# Patient Record
Sex: FEMALE | Race: WHITE | NOT HISPANIC OR LATINO | Employment: UNEMPLOYED
[De-identification: names, ages, dates, MRNs, and addresses within clinical notes are randomized per-mention and may not be internally consistent; named-entity substitution may affect disease eponyms.]

---

## 2017-01-06 ENCOUNTER — RECORDS - HEALTHEAST (OUTPATIENT)
Dept: ADMINISTRATIVE | Facility: OTHER | Age: 27
End: 2017-01-06

## 2017-01-10 ENCOUNTER — COMMUNICATION - HEALTHEAST (OUTPATIENT)
Dept: FAMILY MEDICINE | Facility: CLINIC | Age: 27
End: 2017-01-10

## 2017-01-17 ENCOUNTER — OFFICE VISIT - HEALTHEAST (OUTPATIENT)
Dept: FAMILY MEDICINE | Facility: CLINIC | Age: 27
End: 2017-01-17

## 2017-01-17 DIAGNOSIS — Z76.89 ENCOUNTER TO ESTABLISH CARE: ICD-10-CM

## 2017-01-17 DIAGNOSIS — F11.20 SEVERE OPIOID USE DISORDER (H): ICD-10-CM

## 2017-01-17 DIAGNOSIS — F11.93 ACUTE OPIOID WITHDRAWAL (H): ICD-10-CM

## 2017-01-17 DIAGNOSIS — F11.20 METHADONE MAINTENANCE THERAPY PATIENT (H): ICD-10-CM

## 2017-01-17 LAB
ATRIAL RATE - MUSE: 81 BPM
DIASTOLIC BLOOD PRESSURE - MUSE: NORMAL MMHG
INTERPRETATION ECG - MUSE: NORMAL
P AXIS - MUSE: 54 DEGREES
PR INTERVAL - MUSE: 122 MS
QRS DURATION - MUSE: 84 MS
QT - MUSE: 366 MS
QTC - MUSE: 425 MS
R AXIS - MUSE: 15 DEGREES
SYSTOLIC BLOOD PRESSURE - MUSE: NORMAL MMHG
T AXIS - MUSE: 34 DEGREES
VENTRICULAR RATE- MUSE: 81 BPM

## 2017-01-17 ASSESSMENT — MIFFLIN-ST. JEOR: SCORE: 1636.66

## 2017-03-27 ENCOUNTER — OFFICE VISIT - HEALTHEAST (OUTPATIENT)
Dept: FAMILY MEDICINE | Facility: CLINIC | Age: 27
End: 2017-03-27

## 2017-03-27 DIAGNOSIS — B37.31 CANDIDAL VULVOVAGINITIS: ICD-10-CM

## 2017-03-29 ENCOUNTER — ANESTHESIA - HEALTHEAST (OUTPATIENT)
Dept: OBGYN | Facility: CLINIC | Age: 27
End: 2017-03-29

## 2017-03-30 ENCOUNTER — HOME CARE/HOSPICE - HEALTHEAST (OUTPATIENT)
Dept: HOME HEALTH SERVICES | Facility: HOME HEALTH | Age: 27
End: 2017-03-30

## 2017-04-13 ENCOUNTER — COMMUNICATION - HEALTHEAST (OUTPATIENT)
Dept: OBGYN | Facility: CLINIC | Age: 27
End: 2017-04-13

## 2017-04-27 ENCOUNTER — COMMUNICATION - HEALTHEAST (OUTPATIENT)
Dept: OBGYN | Facility: CLINIC | Age: 27
End: 2017-04-27

## 2017-09-27 ENCOUNTER — COMMUNICATION - HEALTHEAST (OUTPATIENT)
Dept: FAMILY MEDICINE | Facility: CLINIC | Age: 27
End: 2017-09-27

## 2017-12-21 ENCOUNTER — OFFICE VISIT - HEALTHEAST (OUTPATIENT)
Dept: FAMILY MEDICINE | Facility: CLINIC | Age: 27
End: 2017-12-21

## 2017-12-21 DIAGNOSIS — R53.83 FATIGUE DUE TO DEPRESSION: ICD-10-CM

## 2017-12-21 DIAGNOSIS — F32.A FATIGUE DUE TO DEPRESSION: ICD-10-CM

## 2017-12-21 ASSESSMENT — MIFFLIN-ST. JEOR: SCORE: 1681.56

## 2018-01-09 ENCOUNTER — COMMUNICATION - HEALTHEAST (OUTPATIENT)
Dept: FAMILY MEDICINE | Facility: CLINIC | Age: 28
End: 2018-01-09

## 2018-01-31 ENCOUNTER — OFFICE VISIT - HEALTHEAST (OUTPATIENT)
Dept: FAMILY MEDICINE | Facility: CLINIC | Age: 28
End: 2018-01-31

## 2018-01-31 DIAGNOSIS — F11.20 SEVERE OPIOID USE DISORDER (H): ICD-10-CM

## 2018-01-31 DIAGNOSIS — F19.10 DRUG ABUSE (H): ICD-10-CM

## 2018-01-31 DIAGNOSIS — F32.A DEPRESSION: ICD-10-CM

## 2018-01-31 DIAGNOSIS — G90.521 LEG REFLEX SYMPATHETIC DYSTROPHY, RIGHT: ICD-10-CM

## 2018-01-31 DIAGNOSIS — F11.93 ACUTE OPIOID WITHDRAWAL (H): ICD-10-CM

## 2018-01-31 ASSESSMENT — MIFFLIN-ST. JEOR: SCORE: 1619.87

## 2018-01-31 NOTE — ASSESSMENT & PLAN NOTE
She has agreed not to use opoids and only takes otc meds now. (previously was on opoids through pain clinic per her hx and needed methadone to wean off).      I agreed to be her doctor if she agreed she would not ask me to prescribe opoids to her.

## 2018-01-31 NOTE — ASSESSMENT & PLAN NOTE
Was on opoids and methadone in past, no longer takes any narcotics and only uses otc meds, agreed that if I am primary she will not ask me to prescribe opoids.

## 2018-01-31 NOTE — ASSESSMENT & PLAN NOTE
She has agreed not to use opoids and only takes otc meds now. (previously was on opoids through pain clinic per her hx and needed methadone to wean off).      I agreed to be her doctor if sheagreed she would not ask me to prescribe opoids to her.

## 2018-02-02 ENCOUNTER — COMMUNICATION - HEALTHEAST (OUTPATIENT)
Dept: FAMILY MEDICINE | Facility: CLINIC | Age: 28
End: 2018-02-02

## 2018-03-02 ENCOUNTER — OFFICE VISIT - HEALTHEAST (OUTPATIENT)
Dept: FAMILY MEDICINE | Facility: CLINIC | Age: 28
End: 2018-03-02

## 2018-03-02 DIAGNOSIS — N89.8 VAGINAL DISCHARGE: ICD-10-CM

## 2018-07-29 ENCOUNTER — OFFICE VISIT - HEALTHEAST (OUTPATIENT)
Dept: FAMILY MEDICINE | Facility: CLINIC | Age: 28
End: 2018-07-29

## 2018-07-29 DIAGNOSIS — B37.31 CANDIDAL VULVOVAGINITIS: ICD-10-CM

## 2018-08-01 ENCOUNTER — COMMUNICATION - HEALTHEAST (OUTPATIENT)
Dept: FAMILY MEDICINE | Facility: CLINIC | Age: 28
End: 2018-08-01

## 2019-01-18 ENCOUNTER — RECORDS - HEALTHEAST (OUTPATIENT)
Dept: LAB | Facility: CLINIC | Age: 29
End: 2019-01-18

## 2019-01-18 LAB — TSH SERPL DL<=0.005 MIU/L-ACNC: 3.27 UIU/ML (ref 0.3–5)

## 2020-05-29 ENCOUNTER — RECORDS - HEALTHEAST (OUTPATIENT)
Dept: ADMINISTRATIVE | Facility: OTHER | Age: 30
End: 2020-05-29

## 2020-06-02 ENCOUNTER — HOSPITAL ENCOUNTER (OUTPATIENT)
Dept: ULTRASOUND IMAGING | Facility: CLINIC | Age: 30
Discharge: HOME | End: 2020-06-02

## 2020-06-02 DIAGNOSIS — M79.604 PAIN IN RIGHT LEG: ICD-10-CM

## 2020-08-06 ENCOUNTER — ANESTHESIA - HEALTHEAST (OUTPATIENT)
Dept: OBGYN | Facility: HOSPITAL | Age: 30
End: 2020-08-06

## 2020-08-07 ENCOUNTER — COMMUNICATION - HEALTHEAST (OUTPATIENT)
Dept: SCHEDULING | Facility: CLINIC | Age: 30
End: 2020-08-07

## 2020-08-08 ENCOUNTER — HOME CARE/HOSPICE - HEALTHEAST (OUTPATIENT)
Dept: HOME HEALTH SERVICES | Facility: HOME HEALTH | Age: 30
End: 2020-08-08

## 2020-08-12 ENCOUNTER — HOME CARE/HOSPICE - HEALTHEAST (OUTPATIENT)
Dept: HOME HEALTH SERVICES | Facility: HOME HEALTH | Age: 30
End: 2020-08-12

## 2020-08-15 ENCOUNTER — AMBULATORY - HEALTHEAST (OUTPATIENT)
Dept: PEDIATRICS | Facility: CLINIC | Age: 30
End: 2020-08-15

## 2020-09-08 ENCOUNTER — COMMUNICATION - HEALTHEAST (OUTPATIENT)
Dept: PEDIATRICS | Facility: CLINIC | Age: 30
End: 2020-09-08

## 2021-05-26 VITALS
TEMPERATURE: 97.7 F | SYSTOLIC BLOOD PRESSURE: 102 MMHG | DIASTOLIC BLOOD PRESSURE: 72 MMHG | OXYGEN SATURATION: 98 % | HEART RATE: 88 BPM | RESPIRATION RATE: 16 BRPM

## 2021-05-30 VITALS — BODY MASS INDEX: 25.79 KG/M2 | HEIGHT: 71 IN | WEIGHT: 184.2 LBS

## 2021-05-31 VITALS — HEIGHT: 70 IN | WEIGHT: 184 LBS | BODY MASS INDEX: 26.34 KG/M2

## 2021-05-31 VITALS — BODY MASS INDEX: 27.17 KG/M2 | HEIGHT: 71 IN | WEIGHT: 194.1 LBS

## 2021-06-02 ENCOUNTER — RECORDS - HEALTHEAST (OUTPATIENT)
Dept: ADMINISTRATIVE | Facility: CLINIC | Age: 31
End: 2021-06-02

## 2021-06-08 NOTE — PROGRESS NOTES
"PLAN:  ASSESSMENT:  1. Encounter to establish care  2. Methadone maintenance therapy patient  3. Severe opioid use disorder  4. Acute opioid withdrawal  - Electrocardiogram Perform and Read: Normal Sinus with normal QT    PLAN:  Patient Instructions   Continue treatment at pain clinic and follow up for any acute illness.    Orders Placed This Encounter   Procedures     Electrocardiogram Perform and Read     There are no discontinued medications.    No Follow-up on file.    CHIEF COMPLAINT:  Chief Complaint   Patient presents with     Establish Care     pt needs EKG for treatment center-[facility]     Abnormal ECG       HISTORY OF PRESENT ILLNESS:  Dorothy is a 26 y.o. female presenting to the clinic today to establish care and to follow up after an abnormal ECG. She is taking 40 MG of methadone twice daily; she has been taking it since 12/23/2016. She is currently receiving care [facility] for counseling and medication management. She gets her medication daily from [facility] and she receives counseling once a week. She also has a support group that she meets with weekly at [employer].     REVIEW OF SYSTEMS:   She is 29 weeks pregnant.     She has made been making strange noises, like crying, sniffing, etc. in her sleep at night, she thinks due to methadone use. She has been sleeping well.     She last took oxycodone 12/21/2016.     All other systems are negative.    PFSH:  She has one daughter, who is two years old. She lives with her fiance and daughter.     Allergies   Allergen Reactions     Other Environmental Allergy Swelling     Metal       TOBACCO USE:  History   Smoking Status     Never Smoker   Smokeless Tobacco     Never Used       VITALS:  Vitals:    01/17/17 1118   BP: 136/76   Pulse: 66   SpO2: 97%   Weight: 184 lb 3.2 oz (83.6 kg)   Height: 5' 11\" (1.803 m)     Wt Readings from Last 3 Encounters:   01/17/17 184 lb 3.2 oz (83.6 kg)   12/22/16 170 lb (77.1 kg)   12/01/15 160 lb " (72.6 kg)     Body mass index is 25.69 kg/(m^2).     PHYSICAL EXAM:  Constitutional:  Reveals alert, well appearing.  Vitals:  Per nursing notes.  Cardiac:  Regular rate and rhythm without murmurs, rubs, or gallops. Carotids without bruits. Legs without edema. Palpation of the radial pulse regular.  Lungs: Clear.  Respiratory effort normal.  Neurologic:  Cranial nerves II-XII intact.     Psychiatric:  Mood appropriate, memory intact.     EKG: Normal    QUALITY MEASURES:  I have had an Advance Directives discussion with the patient.    ADDITIONAL HISTORY SUMMARIZED (2): Reviewed hospital admission note, 12/23/2016. Reviewed ED note for withdrawal, 12/1/2015.   DECISION TO OBTAIN EXTRA INFORMATION (1): None.   RADIOLOGY TESTS (1): None.  LABS (1): None.  MEDICINE TESTS (1): Ordered EKG.   INDEPENDENT REVIEW (2 each): None.     The visit lasted a total of 22 minutes face to face with the patient. Over 50% of the time was spent counseling and educating the patient about Methadone use.    IClaire, am scribing for and in the presence of, Promise Amajiri, FNP.    IAnnette FNP personally performed the services described in this documentation, as scribed by Claire Garrett in my presence, and it is both accurate and complete.    MEDICATIONS:  Current Outpatient Prescriptions   Medication Sig Dispense Refill     acetaminophen (TYLENOL) 325 MG tablet Take by mouth.       doxylamine (UNISON) 25 mg tablet Take 1 tablet (25 mg total) by mouth bedtime as needed for sleep. 30 tablet 0     enoxaparin (LOVENOX) 40 mg/0.4 mL syringe Inject 40 mg under the skin daily.  2     methadone (METHADOSE) 40 mg disintegrating tablet Take 40 mg by mouth 2 (two) times a day.       prenatal vitamin iron-folic acid 27mg-0.8mg (PRENATAL S) 27 mg iron- 800 mcg Tab tablet Take 1 tablet by mouth daily.       senna-docusate (PERICOLACE) 8.6-50 mg tablet Take 1-4 tablets by mouth 2 (two) times a day.  0     sertraline (ZOLOFT) 25 MG  tablet Take 1 tablet (25 mg total) by mouth daily. 30 tablet 0     ondansetron (ZOFRAN) 4 MG tablet TK ONE T PO TID PRN NV  0     pyridoxine, vitamin B6, (B-6) 25 MG tablet Take 1 tablet (25 mg total) by mouth bedtime as needed (give with doxylamine).  0     No current facility-administered medications for this visit.

## 2021-06-09 NOTE — ANESTHESIA PREPROCEDURE EVALUATION
Anesthesia Evaluation      Patient summary reviewed   No history of anesthetic complications     Airway   Mallampati: II  Neck ROM: full   Pulmonary - negative ROS and normal exam    breath sounds clear to auscultation                         Cardiovascular - normal exam  Rhythm: regular  Rate: normal,         Neuro/Psych    (+) neuromuscular disease,  anxiety/panic attacks,     Endo/Other    (+) obesity, pregnant     GI/Hepatic/Renal            Dental - normal exam                        Anesthesia Plan  Planned anesthetic: epidural    ASA 2     Anesthetic plan and risks discussed with: patient and spouse    Post-op plan: routine recovery

## 2021-06-09 NOTE — ANESTHESIA PROCEDURE NOTES
Epidural Block    Patient location during procedure: OB  Time Called: 3/29/2017 9:30 PM  Reason for Block:labor epidural  Staffing:  Performing  Anesthesiologist: ZEFERINO SCHWARTZ  Preanesthetic Checklist  Completed: patient identified, risks, benefits, and alternatives discussed, timeout performed, consent obtained, airway assessed, oxygen available, suction available, emergency drugs available and hand hygiene performed  Procedure  Patient position: sitting  Prep: ChloraPrep  Patient monitoring: continuous pulse oximetry, heart rate and blood pressure  Approach: midline  Location: L2-L3  Injection technique: JYOTI air  Number of Attempts:1  Needle  Needle type: Christina   Needle gauge: 17 G     Catheter in Space: 6  Assessment  Sensory level: T10  No complications

## 2021-06-09 NOTE — ANESTHESIA POSTPROCEDURE EVALUATION
Patient: Dorothy Mitchell  * No procedures listed *  Anesthesia type: regional    Patient location: Labor and Delivery  Last vitals:   Vitals:    03/30/17 0431   BP: 118/70   Pulse: 71   Resp:    Temp:    SpO2:      Post vital signs: stable  Level of consciousness: awake and responds to simple questions  Post-anesthesia pain: pain controlled  Post-anesthesia nausea and vomiting: no  Pulmonary: unassisted, return to baseline  Cardiovascular: stable and blood pressure at baseline  Hydration: adequate  Anesthetic events: no    QCDR Measures:  ASA# 11 - Jenny-op Cardiac Arrest: ASA11B - Patient did NOT experience unanticipated cardiac arrest  ASA# 12 - Jenny-op Mortality Rate: ASA12B - Patient did NOT die  ASA# 13 - PACU Re-Intubation Rate: NA - No ETT / LMA used for case  ASA# 10 - Composite Anes Safety: ASA10A - No serious adverse event  ASA# 38 - New Corneal Injury: ASA38A - No new exposure keratitis or corneal abrasion in PACU    Additional Notes:

## 2021-06-11 NOTE — TELEPHONE ENCOUNTER
"I'm covering for LYDIA Coughlin, today.  I reviewed infant's chart (Patric Lin) and it looks like infant was being treated for oral thrush.   Dr. Flowers placed a topical nystatin for mother as she is breast-feeding.    It looks like pharmacy is requesting that this prescription be sent under mother's chart.     I attempted to call parent to provide alternative OTC medication. But there was no answer.    When parents calls back, please advise on all-purpose nipple cream as stated below.    If the prescription All-Purpose Nipple Ointment is too expensive or difficult to obtain,   you can use a [id number] alternative with over-the-counter medications.       Purchase:   1% hydrocortisone cream   Antibiotic ointment (such as Bactroban or Bacitracin)   Miconazole cream (an antifungal:  Found with treatments for vaginal yeast, \"jock itch\" and athlete's foot)     After each feeding, mix a dab of each in your palm and apply thinly to both nipples.   You do not need to wash it off before the next feeding.     After 2-3 days, you can stop including the hydrocortisone.     Also recommend for mother to contact her PCP, if thrush symptoms do not improve.     Thanks,  LYDIA Garza, CPNP, IBCLC  [facility]  [facility]  9/10/2020, 1:19 PM      "

## 2021-06-11 NOTE — TELEPHONE ENCOUNTER
"Left message to call back for: patient  Information to relay to patient:  See providers note below.         I'm covering for LYDIA Coughlin, today.  I reviewed infant's chart (Patrickirby Lin) and it looks like infant was being treated for oral thrush.   Dr. Flowers placed a topical nystatin for mother as she is breast-feeding.     It looks like pharmacy is requesting that this prescription be sent under mother's chart.      I attempted to call parent to provide alternative OTC medication. But there was no answer.     When parents calls back, please advise on all-purpose nipple cream as stated below.     If the prescription All-Purpose Nipple Ointment is too expensive or difficult to obtain,   you can use a [id number]-gc-yohejweo alternative with over-the-counter medications.       Purchase:   1% hydrocortisone cream   Antibiotic ointment (such as Bactroban or Bacitracin)   Miconazole cream (an antifungal:  Found with treatments for vaginal yeast, \"jock itch\" and athlete's foot)     After each feeding, mix a dab of each in your palm and apply thinly to both nipples.   You do not need to wash it off before the next feeding.     After 2-3 days, you can stop including the hydrocortisone.      Also recommend for mother to contact her PCP, if thrush symptoms do not improve.      Thanks,  LYDIA Garza, CPNP, IBCLC  [facility]  [facility]  9/10/2020, 1:19 PM  "

## 2021-06-11 NOTE — TELEPHONE ENCOUNTER
Medication Request    Medication name:   nystatin (MYCOSTATIN) ointment  30 g  1  9/2/2020      Sig - Route: Apply topically 4 (four) times a day. Apply to breastfeeding mom's nipples         [id number] - [place], [place] - [address] AT [facility] [address]     Reason for request:   Per pharmacy. This scripts needs to be written for mom, not the child.    When did you use medication last?:    NA    Patient offered appointment:  N/A - electronic request     Okay to leave a detailed message: yes    Please send script to patients pharmacy if appropriate and notify the patient to the outcome of this request.

## 2021-06-12 ENCOUNTER — HEALTH MAINTENANCE LETTER (OUTPATIENT)
Age: 31
End: 2021-06-12

## 2021-06-14 NOTE — PROGRESS NOTES
Office Visit - Follow Up   Dortohy Mitchell   27 y.o. female    Date of Visit: 12/21/2017    Chief Complaint   Patient presents with     Fatigue     concerns of thyroid        Assessment and Plan   1. Fatigue due to depression  Discussed diagnosis with patient.  Reassured that serious underlying cause for the fatigue is very unlikely.  Discussed how depression can be a cause of fatigue.  Encourage patient to continue to visit with her OB/GYN for treatment for depression and if symptoms persist she can follow-up with me for medication adjustment.  Informed patient that I am unable to prescribe phentermine for mood disorder/low energy   Follow up in 2 weeks or as needed.        History of Present Illness   This 27 y.o. old female patient with history of chronic low back pain, and a metromenorrhagia, anemia, endometriosis, RSD lower limb, depression, drug abuse anxiety disorder and acute opiate withdrawal presents to the clinic today for a refill of some of her medications that was initially started by her previous PCP.  Patient reported that she was taking phentermine with metformin for her low energy and she was also placed on levothyroxine for hypothyroidism.  She stated that she has continued to take levothyroxine and metformin but she is not having much energy as she was when she was on phentermine phentermine.  She reported that she stopped taking phentermine because she ran out.  She stated that her OB/GYN has refused to refill her phentermine but that she is helping her with her depression she is currently taking Paxil 40 mg.  Patient reported that with the Paxil that she still not having much interest in doing the things she wants to do and sometimes she is very tired even though she has 2 kids.  She denies any suicidal ideation or homicidal ideation.      Review of Systems: A comprehensive review of systems was negative except as noted.     Medications, Allergies and Problem List   Reviewed and updated      Physical Exam        Additional Information   Current Outpatient Prescriptions   Medication Sig Dispense Refill     acetaminophen (TYLENOL) 325 MG tablet Take by mouth.       levothyroxine (SYNTHROID, LEVOTHROID) 100 MCG tablet   0     metFORMIN (GLUCOPHAGE) 500 MG tablet   2     methadone (DOLOPHINE) 10 mg/mL solution Take 80 mg by mouth 2 times a day at 9:00am and 5:00pm. AM and 5pm       phentermine (ADIPEX-P) 37.5 mg tablet   0     prenatal vitamin iron-folic acid 27mg-0.8mg (PRENATAL S) 27 mg iron- 800 mcg Tab tablet Take 1 tablet by mouth daily.       sertraline (ZOLOFT) 25 MG tablet Take 1 tablet (25 mg total) by mouth daily. 30 tablet 0     No current facility-administered medications for this visit.      Allergies   Allergen Reactions     No Known Drug Allergies      Other Environmental Allergy Swelling     Metal     Social History   Substance Use Topics     Smoking status: Never Smoker     Smokeless tobacco: Never Used     Alcohol use No       Time: total time spent with the patient was 25 minutes of which >50% was spent in counseling and coordination of care     Annette [place], CNP

## 2021-06-15 NOTE — PROGRESS NOTES
ASSESSMENT AND PLAN:  Problem List Items Addressed This Visit     RSD lower limb, right     Was on opoids and methadone in past, no longer takes any narcotics and only uses otc meds, agreed that if I am primary she will not ask me to prescribe opoids.          Depression     wellbutrin 300 mg 24 hr tab po q day.          Relevant Medications    buPROPion (WELLBUTRIN XL) 300 MG 24 hr tablet    Drug abuse     She has agreed not to use opoids and only takes otc meds now. (previously was on opoids through pain clinic per her hx and needed methadone to wean off).      I agreed to be her doctor if she agreed she would not ask me to prescribe opoids to her.          Severe opioid use disorder     She has agreed not to use opoids and only takes otc meds now. (previously was on opoids through pain clinic per her hx and needed methadone to wean off).      I agreed to be her doctor if she agreed she would not ask me to prescribe opoids to her.          BMI 26.0-26.9,adult     Reviewed the 'foods to include' hand out and discussed My weight loss tips which were printed in her after visit summary.         RESOLVED: Acute opioid withdrawal     She has agreed not to use opoids and only takes otc meds now. (previously was on opoids through pain clinic per her hx and needed methadone to wean off).      I agreed to be her doctor if she agreed she would not ask me to prescribe opoids to her.                 Chief Complaint   Patient presents with     Consult     Patient was diagnosed with a thyroid condition by her OBGYN, and was directed to come here to discuss getting phentermine.      HOWIE  Dorothy Mitchell is a 27 y.o. female sees Dr. Torres for her gynecological issues and has basically been using Dr. Torres as her primary care doctor until now.  Today she is here to establish care with me on Dr. Torres's recommendation.    She told me that she started metformin and Wellbutrin through Dr. Torres for weight loss about a month  "ago.  She has taken phentermine in the past and is wondering about taking that again today.  She wants to lose weight.    History   Smoking Status     Never Smoker   Smokeless Tobacco     Never Used      Current Outpatient Prescriptions   Medication Sig Dispense Refill     buPROPion (WELLBUTRIN XL) 300 MG 24 hr tablet   0     levothyroxine (SYNTHROID, LEVOTHROID) 100 MCG tablet   0     metFORMIN (GLUCOPHAGE) 500 MG tablet   2     MULTIVITAMIN ORAL Take by mouth.       No current facility-administered medications for this visit.      Allergies   Allergen Reactions     No Known Drug Allergies      Other Environmental Allergy Swelling     Metal     Review of Systems   Constitutional: Negative.    HENT: Negative.    Eyes: Negative.    Respiratory: Negative.    Cardiovascular: Negative.    Gastrointestinal: Negative.    Endocrine: Negative.    Genitourinary: Negative.    Musculoskeletal: Negative.    Skin: Negative.    Neurological: Negative.    Hematological: Negative.    Psychiatric/Behavioral: Negative.        OBJECTIVE: /82  Pulse 84  Resp 16  Ht 5' 10\" (1.778 m)  Wt 184 lb (83.5 kg)  LMP 01/24/2018 (Approximate)  Breastfeeding? No  BMI 26.4 kg/m2  Physical Exam   Constitutional: She is oriented to person, place, and time. She appears well-developed and well-nourished. No distress.   HENT:   Head: Normocephalic and atraumatic.   Eyes: Conjunctivae are normal.   Neck: Neck supple.   Cardiovascular: Normal rate and regular rhythm.    Pulmonary/Chest: Effort normal.   Musculoskeletal: Normal range of motion.   Neurological: She is alert and oriented to person, place, and time.   Skin: Skin is warm and dry.   Psychiatric: She has a normal mood and affect.     "

## 2021-06-16 PROBLEM — Z34.90 PREGNANT: Status: ACTIVE | Noted: 2020-08-06

## 2021-06-16 NOTE — PROGRESS NOTES
Assessment:   The encounter diagnosis was Vaginal discharge.     Plan:     Medications Ordered   Medications     fluconazole (DIFLUCAN) 150 MG tablet     Sig: Take 1 tablet (150 mg total) by mouth once for 1 dose.     Dispense:  1 tablet     Refill:  0     Patient Instructions     Based on the information that you have provided, I have placed an order for you to start treatment.  View your full visit summary for details. Click on the link below to access your visit summary.    Your pharmacist will address any questions you may have about taking the medication.  If you don't see improvement after 3 days of treatment I would recommend you come in for an appointment to discuss these symptoms. You are able to schedule an appointment within [facility] at your convenience.    If you do need to come in for this same symptom within the next seven days, your eVisit will be free of charge.      Return for further follow up if needed. Call [phone] or schedule an appointment via [facility]..    Subjective:   Dorothy Mitchell is a 28 y.o. female who submitted an eVisit request for evaluation of her Vaginal Discharge.  See the questionnaire and message section of encounter report for information related to history of present illness and review of systems.    The following portions of the patient's history were reviewed and updated as appropriate:  She  does not have any pertinent problems on file.  She is allergic to no known drug allergies and other environmental allergy..     Objective:   No exam performed today, patient submitted as eVisit.

## 2021-06-19 NOTE — PROGRESS NOTES
Assessment:   The encounter diagnosis was Candidal vulvovaginitis.     Plan:     Medications Ordered   Medications     fluconazole (DIFLUCAN) 150 MG tablet     Sig: Take 1 tablet (150 mg total) by mouth once for 1 dose.     Dispense:  2 tablet     Refill:  0     miconazole (MICATIN) 2 % cream     Sig: Apply topically as needed for itching.     Dispense:  28.35 g     Refill:  0     Patient Instructions       Based on the information that you have provided, I have placed an order for you to start treatment.  View your full visit summary for details. Click on the link below to access your visit summary.    Your pharmacist will address any questions you may have about taking the medication.  See below for information regarding yeast infections.    Please take Diflucan 150mg orally x 1.  May repeat the dose in 72 hours if no improvement or incomplete clearance of symptoms.     In the future, take probiotics while on antibiotics. Remove wet or sweaty clothing immediately.    Follow up with primary care provider if no improvement in 1 week, sooner if worsening.    Vaginal Infection: Yeast (Candidiasis)  Yeast infection occurs when yeast in the vagina increase and start attacking the vaginal tissues. Yeast is a type of fungus. These infections are often caused by a type of yeast called Candida albicans. Other species of yeast can also cause infections. Factors that may make infection more likely include recent antibiotic use, douching, or increased sex. Yeast infections are more common in women who have diabetes, or are obese or pregnant, or have a weak immune system.  Symptoms of yeast infection    Clumpy or thin, white discharge, which may look like cottage cheese    No odor or minimal odor    Severe vaginal itching or burning    Burning with urination    Swelling, redness of vulva    Pain during sex  Treating yeast infection  Yeast infection is treated with a vaginal antifungal cream. In some cases, antifungal pills  are prescribed instead. During treatment:    Finish all of your medicine, even if your symptoms go away.    Apply the cream before going to bed. Lie flat after applying so that it doesn't drip out.    Do not douche or use tampons.    Don't rely on a diaphragm or condoms, since the cream may weaken them.    Avoid intercourse if advised by your healthcare provider.     Should I treat a yeast infection myself?  Discuss with your healthcare provider whether you should use over-the-counter medicines to treat a yeast infection. Self-treatment may depend on whether:    You've had a yeast infection in the past.    You're at risk for STDs.  Call your healthcare provider if symptoms do not go away or come back after treatment.   Date Last Reviewed: 5/12/2015 2000-2016 [employer]. [address] [place] [address]. All rights reserved. This information is not intended as a substitute for professional medical care. Always follow your healthcare professional's instructions.            Return for further follow up if needed. Call [phone] or schedule an appointment via "[employer]..    Subjective:   Dorothy Mitchell is a 28 y.o. female who submitted an eVisit request for evaluation of her Vaginal Discharge.  See the questionnaire and message section of encounter report for information related to history of present illness and review of systems.    The following portions of the patient's history were reviewed and updated as appropriate:  She  does not have any pertinent problems on file.  She is allergic to no known drug allergies and other environmental allergy..     Objective:   No exam performed today, patient submitted as eVisit.

## 2021-10-02 ENCOUNTER — HEALTH MAINTENANCE LETTER (OUTPATIENT)
Age: 31
End: 2021-10-02

## 2021-10-11 ENCOUNTER — HOSPITAL ENCOUNTER (EMERGENCY)
Facility: CLINIC | Age: 31
Discharge: HOME | End: 2021-10-11
Attending: STUDENT IN AN ORGANIZED HEALTH CARE EDUCATION/TRAINING PROGRAM | Admitting: STUDENT IN AN ORGANIZED HEALTH CARE EDUCATION/TRAINING PROGRAM
Payer: COMMERCIAL

## 2021-10-11 ENCOUNTER — APPOINTMENT (OUTPATIENT)
Dept: CT IMAGING | Facility: CLINIC | Age: 31
End: 2021-10-11
Attending: STUDENT IN AN ORGANIZED HEALTH CARE EDUCATION/TRAINING PROGRAM
Payer: COMMERCIAL

## 2021-10-11 VITALS
DIASTOLIC BLOOD PRESSURE: 90 MMHG | HEART RATE: 99 BPM | TEMPERATURE: 97.8 F | RESPIRATION RATE: 18 BRPM | OXYGEN SATURATION: 100 % | WEIGHT: 160 LBS | BODY MASS INDEX: 22.32 KG/M2 | SYSTOLIC BLOOD PRESSURE: 135 MMHG

## 2021-10-11 DIAGNOSIS — S01.81XA FACIAL LACERATION, INITIAL ENCOUNTER: ICD-10-CM

## 2021-10-11 PROCEDURE — 250N000013 HC RX MED GY IP 250 OP 250 PS 637: Performed by: STUDENT IN AN ORGANIZED HEALTH CARE EDUCATION/TRAINING PROGRAM

## 2021-10-11 PROCEDURE — 99284 EMERGENCY DEPT VISIT MOD MDM: CPT | Mod: 25

## 2021-10-11 PROCEDURE — 12011 RPR F/E/E/N/L/M 2.5 CM/<: CPT

## 2021-10-11 PROCEDURE — 70450 CT HEAD/BRAIN W/O DYE: CPT

## 2021-10-11 RX ORDER — ACETAMINOPHEN 325 MG/1
650 TABLET ORAL ONCE
Status: COMPLETED | OUTPATIENT
Start: 2021-10-11 | End: 2021-10-11

## 2021-10-11 RX ADMIN — ACETAMINOPHEN 650 MG: 325 TABLET ORAL at 10:18

## 2021-10-11 ASSESSMENT — ENCOUNTER SYMPTOMS
NECK PAIN: 0
BACK PAIN: 0
COLOR CHANGE: 1
HEADACHES: 1

## 2021-10-11 NOTE — ED PROVIDER NOTES
EMERGENCY DEPARTMENT ENCOUNTER      NAME: Dorothy Mitchell  AGE: 31 year old female  YOB: 1990  MRN: [id number]  EVALUATION DATE & TIME: 10/11/2021  8:50 AM    PCP: Edelmira Antoine [facility]    ED PROVIDER: Joyce France MD      Chief Complaint   Patient presents with     Fall     Laceration       FINAL IMPRESSION:  1. Facial laceration, initial encounter        ED COURSE & MEDICAL DECISION MAKIN year old old female who presents to the ED for evaluation of head injury/facial laceration.   History and physical examination as documented. Vital signs reassuring.   Head injury while sleep walking last night. Unclear about LOC so CT obtained.  No findings of intracranial bleed or skull fracture.  Has a small facial laceration that is gaping and will need approximation.  Closed with absorbable sutures.  Discussed that she will likely have a scar here.  She was given Tylenol for headache.  No other injury on exam.  Does have some ecchymosis over the right low back but is non-tender in the abdomen and in the back. I do not suspect intraperitoneal bleeding and CT not indicated due to lack of tenderness. Tetanus is UTD.  Bibi return precautions.  Discharged in stable condition.         Scribe time stamps  9:23 AM I met with the patient for the initial interview and physical examination. Discussed plan for treatment and workup in the ED.    10:50 AM Performed laceration repair.   11:57 AM I discussed the plan for discharge with the patient, and patient is agreeable. We discussed supportive cares at home and reasons for return to the ER including new or worsening symptoms - all questions and concerns addressed. Patient to be discharged by RN.       PPE: Provider wore gloves, N95 mask, eye protection, surgical cap, and paper mask.     MEDICATIONS GIVEN IN THE EMERGENCY:  Medications   acetaminophen (TYLENOL) tablet 650 mg (650 mg Oral Given 10/11/21 1018)     NEW PRESCRIPTIONS STARTED AT TODAY'S ER  "VISIT  Discharge Medication List as of 10/11/2021 11:57 AM        =================================================================    HPI    Patient information was obtained from: Patient    Patient is accompanied by her .     Use of : N/A     Dorothy Mitchell is a 31 year old female with a pertinent history of blood clotting disorder, anemia, and opioid withdrawal (currently on sustained methadone), and sleepwalking who presents to this ED via walk-in for evaluation after an unwitnessed fall.     Per patient's , he heard a thud last night around 0841-6457, but did not get up at the time. In the morning, patient's  found her laying on the bathroom floor. Patient woke up around 0600 this morning on the bathroom floor with blood on the floor from a laceration she sustained to the right side of her forehead. She does not remember a fall. She states that she was initially \"woozy\" after waking up, but felt better after awhile. Here in the ED, patient endorses a 3/10 throbbing headache and notes that she has a bruise to the right side of her lower back without much pain. Patient notes a history of sleepwalking, which she saw neurology for and was told her imaging was normal. The sleepwalking had since resolved and she did not do further follow up. Patient denies neck pain, back pain, and any other symptoms or complaints at this time.     REVIEW OF SYSTEMS   Review of Systems   Constitutional:        Positive for unwitnessed fall   Musculoskeletal: Negative for back pain and neck pain.   Skin: Positive for color change (bruise right lower back).        Positive for laceration (right side forehead)   Neurological: Positive for headaches.   All other systems reviewed and are negative.      PAST MEDICAL HISTORY:  Past Medical History:   Diagnosis Date     Acute opioid withdrawal (H)      Anemia      Anxiety disorder      Blood clotting disorder (H)     pregnancy     Colloid cyst of third " ventricle (H)      Depression      Depression affecting pregnancy in second trimester, antepartum      High risk pregnancy due to maternal drug abuse (H)      Low back pain of over 3 months duration      Miscarriage     X5     Ovarian cyst      RSD lower limb     Right foot     RSD lower limb, right 10/26/2015     PAST SURGICAL HISTORY:  Past Surgical History:   Procedure Laterality Date     APPENDECTOMY  2011     APPENDECTOMY OPEN       C APPENDECTOMY      Description: Appendectomy;  Recorded: 05/02/2012;     C APPENDECTOMY      Description: Appendectomy;  Recorded: 11/20/2012;     DILATION AND CURETTAGE       HC DILATION/CURETTAGE DIAG/THER NON OB      Description: Dilation And Curettage;  Recorded: 05/02/2012;     HC REMOVAL OF TONSILS,<11 Y/O      Description: Tonsillectomy;  Recorded: 05/02/2012;     HC REMOVAL OF TONSILS,<11 Y/O      Description: Tonsillectomy;  Recorded: 11/20/2012;     LAPAROSCOPY DIAGNOSTIC (GENERAL)  10/30/15     TONSILLECTOMY       TONSILLECTOMY       ALLERGIES:  Allergies   Allergen Reactions     No Known Drug Allergies Unknown     Other Environmental Allergy Swelling     Metal     FAMILY HISTORY:  Family History   Problem Relation Age of Onset     Diabetes Mother      Heart Disease Father      Depression Maternal Grandmother      Hypertension Maternal Grandmother      Anxiety Disorder Maternal Grandmother      Hypertension Maternal Grandfather      Diabetes Brother      SOCIAL HISTORY:   Social History     Socioeconomic History     Marital status: Single     Spouse name: Not on file     Number of children: 1     Years of education: Not on file     Highest education level: Not on file   Occupational History     Not on file   Tobacco Use     Smoking status: Never Smoker     Smokeless tobacco: Never Used   Substance and Sexual Activity     Alcohol use: No     Comment: weekends     Drug use: Yes     Types: Oxycodone     Sexual activity: Yes     Partners: Male     Birth control/protection:  None   Other Topics Concern     Not on file   Social History Narrative    Lives with family.      Social Determinants of Health     Financial Resource Strain:      Difficulty of Paying Living Expenses:    Food Insecurity:      Worried About Running Out of Food in the Last Year:      Ran Out of Food in the Last Year:    Transportation Needs:      Lack of Transportation (Medical):      Lack of Transportation (Non-Medical):    Physical Activity:      Days of Exercise per Week:      Minutes of Exercise per Session:    Stress:      Feeling of Stress :    Social Connections:      Frequency of Communication with Friends and Family:      Frequency of Social Gatherings with Friends and Family:      Attends Mandaeism Services:      Active Member of Clubs or Organizations:      Attends Club or Organization Meetings:      Marital Status:    Intimate Partner Violence:      Fear of Current or Ex-Partner:      Emotionally Abused:      Physically Abused:      Sexually Abused:        VITALS:  BP (!) 135/90   Pulse 99   Temp 97.8  F (36.6  C) (Temporal)   Resp 18   Wt 72.6 kg (160 lb)   LMP 09/28/2021   SpO2 100%   Breastfeeding Unknown   BMI 22.32 kg/m      PHYSICAL EXAM    General: Comfortable appearing.  HEENT: No gross facial asymmetry. No external evidence of trauma on visual inspection.  Neck: Moving freely.  Chest/Pulm: No respiratory distress. Breathing comfortably on room air.  CV: Regular rate. Extremities are warm and well perfused.  Abdomen: Soft and non-distended without tenderness to palpation.  MSK/Extremities: Moving all 4 extremities appropriately, no deformity. No cervical midline tenderness. No midline tenderness in thoracic or lumbar spine.   Skin: 2cm V shaped laceration just lateral to right eyebrow that is gaping.   Neuro: Alert, conversant, appropriate. CN II-XII grossly intact.  Psych: Behavior normal.       LAB:  All pertinent labs reviewed and interpreted.  Results for orders placed or performed  during the hospital encounter of 10/11/21   Head CT w/o contrast    Impression    IMPRESSION:  1.  No acute intracranial abnormality.  2.  No calvarial or skull base fracture.       RADIOLOGY:  Reviewed all pertinent imaging. Please see official radiology report.  Head CT w/o contrast   Final Result   IMPRESSION:   1.  No acute intracranial abnormality.   2.  No calvarial or skull base fracture.          PROCEDURES:     PROCEDURE: Laceration Repair   INDICATIONS: Laceration   PROCEDURE PROVIDER: Joyce France    SITE: Right side forehead   TYPE/SIZE: simple, clean and no foreign body visualized  2 cm (total length)   FUNCTIONAL ASSESSMENT: Distal sensation, circulation and motor intact   MEDICATION: 2 cc of 1% Lidocaine without epinephrine   PREPARATION: irrigation with Normal saline   DEBRIDEMENT: no debridement   CLOSURE:  Wound was closed in   one layer: Skin closed with 3 stitches of 5-0 chromic gut    Total number of sutures/staples placed: 3        I, Carey Hoffman, am serving as a scribe to document services personally performed by Dr. Joyce France based on my observation and the provider's statements to me. I, Joyce France MD attest that Carey Hoffman is acting in a scribe capacity, has observed my performance of the services and has documented them in accordance with my direction.    Joyce France M.D.  Emergency Medicine  [facility]      Joyce France MD  10/11/21 5813

## 2021-10-11 NOTE — DISCHARGE INSTRUCTIONS
Stitches in your face will dissolve on their own. Return for extending redness, fevers, or pus drainage from the wound.

## 2022-04-12 ENCOUNTER — E-VISIT (OUTPATIENT)
Dept: URGENT CARE | Facility: CLINIC | Age: 32
End: 2022-04-12
Payer: COMMERCIAL

## 2022-04-12 DIAGNOSIS — B37.31 CANDIDAL VULVOVAGINITIS: Primary | ICD-10-CM

## 2022-04-12 PROCEDURE — 99421 OL DIG E/M SVC 5-10 MIN: CPT | Performed by: PHYSICIAN ASSISTANT

## 2022-04-12 RX ORDER — FLUCONAZOLE 150 MG/1
150 TABLET ORAL ONCE
Qty: 1 TABLET | Refills: 1 | Status: SHIPPED | OUTPATIENT
Start: 2022-04-12 | End: 2022-04-12

## 2022-04-12 NOTE — PATIENT INSTRUCTIONS
Yeast Infection (Candida Vaginal Infection)    You have a Candida vaginal infection. This is also known as a yeast infection. It's most often caused by a type of yeast (fungus) called Candida. Candida are normally found in the vagina. But if they increase in number, this can lead to infection and cause symptoms.   Symptoms of a yeast infection can include:     Clumpy or thin, white discharge, which may look like cottage cheese    Itching or burning    Burning with urination  Certain factors can make a yeast infection more likely. These can include:     Taking certain medicines, such as antibiotics or birth control pills    Pregnancy    Diabetes    Weak immune system  A yeast infection is most often treated with antifungal medicine. This may be given as a vaginal cream or pills you take by mouth. Treatment may last for about 1 to 7 days. Women with severe or recurrent infections may need longer courses of treatment.   Home care    If you re prescribed medicine, be sure to use it as directed. Finish all of the medicine, even if your symptoms go away. Don t try to treat yourself using over-the-counter products without talking with your provider first. They will let you know if this is a good option for you.    Ask your provider what steps you can take to help reduce your risk of having a yeast infection in the future.    Follow-up care  Follow up with your healthcare provider, or as directed.   When to seek medical advice  Call your healthcare provider right away if:     You have a fever of 100.4 F (38 C) or higher, or as directed by your provider.    Your symptoms worsen, or they don t go away within a few days of starting treatment.    You have new pain in the lower belly or pelvic region.    You have side effects that bother you or a reaction to the cream or pills you re prescribed.    You or any partners you have sex with have new symptoms, such as a rash, joint pain, or sores.  Dulce last reviewed this  educational content on 7/1/2020 2000-2021 [employer]. All rights reserved. This information is not intended as a substitute for professional medical care. Always follow your healthcare professional's instructions.

## 2022-07-09 ENCOUNTER — HEALTH MAINTENANCE LETTER (OUTPATIENT)
Age: 32
End: 2022-07-09

## 2022-09-03 ENCOUNTER — HEALTH MAINTENANCE LETTER (OUTPATIENT)
Age: 32
End: 2022-09-03

## 2022-10-27 ENCOUNTER — HOSPITAL ENCOUNTER (EMERGENCY)
Facility: CLINIC | Age: 32
Discharge: HOME | End: 2022-10-28
Attending: EMERGENCY MEDICINE | Admitting: EMERGENCY MEDICINE
Payer: COMMERCIAL

## 2022-10-27 ENCOUNTER — APPOINTMENT (OUTPATIENT)
Dept: RADIOLOGY | Facility: CLINIC | Age: 32
End: 2022-10-27
Attending: EMERGENCY MEDICINE
Payer: COMMERCIAL

## 2022-10-27 VITALS
DIASTOLIC BLOOD PRESSURE: 65 MMHG | RESPIRATION RATE: 16 BRPM | SYSTOLIC BLOOD PRESSURE: 116 MMHG | HEART RATE: 88 BPM | WEIGHT: 160 LBS | BODY MASS INDEX: 22.4 KG/M2 | HEIGHT: 71 IN | OXYGEN SATURATION: 100 % | TEMPERATURE: 97.6 F

## 2022-10-27 DIAGNOSIS — M54.50 LOW BACK PAIN RADIATING TO RIGHT LEG: ICD-10-CM

## 2022-10-27 DIAGNOSIS — M79.604 LOW BACK PAIN RADIATING TO RIGHT LEG: ICD-10-CM

## 2022-10-27 PROBLEM — E72.10 DISORDER OF SULFUR-BEARING AMINO ACID METABOLISM (H): Status: ACTIVE | Noted: 2022-10-27

## 2022-10-27 PROBLEM — F50.20 BULIMIA NERVOSA: Status: ACTIVE | Noted: 2022-10-27

## 2022-10-27 PROBLEM — Z98.890 HISTORY OF ILEOSTOMY: Status: ACTIVE | Noted: 2022-10-27

## 2022-10-27 PROCEDURE — 72100 X-RAY EXAM L-S SPINE 2/3 VWS: CPT

## 2022-10-27 PROCEDURE — 250N000013 HC RX MED GY IP 250 OP 250 PS 637: Performed by: EMERGENCY MEDICINE

## 2022-10-27 PROCEDURE — 99284 EMERGENCY DEPT VISIT MOD MDM: CPT | Mod: 25

## 2022-10-27 PROCEDURE — 250N000011 HC RX IP 250 OP 636: Performed by: EMERGENCY MEDICINE

## 2022-10-27 PROCEDURE — 96372 THER/PROPH/DIAG INJ SC/IM: CPT | Performed by: EMERGENCY MEDICINE

## 2022-10-27 RX ORDER — KETOROLAC TROMETHAMINE 15 MG/ML
30 INJECTION, SOLUTION INTRAMUSCULAR; INTRAVENOUS ONCE
Status: COMPLETED | OUTPATIENT
Start: 2022-10-27 | End: 2022-10-27

## 2022-10-27 RX ORDER — DIAZEPAM 5 MG
5 TABLET ORAL ONCE
Status: COMPLETED | OUTPATIENT
Start: 2022-10-27 | End: 2022-10-27

## 2022-10-27 RX ADMIN — KETOROLAC TROMETHAMINE 30 MG: 15 INJECTION, SOLUTION INTRAMUSCULAR; INTRAVENOUS at 22:14

## 2022-10-27 RX ADMIN — DIAZEPAM 5 MG: 5 TABLET ORAL at 22:10

## 2022-10-27 ASSESSMENT — ACTIVITIES OF DAILY LIVING (ADL): ADLS_ACUITY_SCORE: 35

## 2022-10-27 NOTE — ED TRIAGE NOTES
Pt arrives to ED with c/o lower back pain since Tuesday. Since Tuesday pt endorses to the pain moving into her right hip and down right leg. Pt denies injury or trauma. Pt has been taking ibuprofen and tylenol.      Triage Assessment     Row Name 10/27/22 9758       Triage Assessment (Adult)    Airway WDL WDL       Respiratory WDL    Respiratory WDL WDL       Skin Circulation/Temperature WDL    Skin Circulation/Temperature WDL WDL       Cardiac WDL    Cardiac WDL WDL       Peripheral/Neurovascular WDL    Peripheral Neurovascular WDL WDL       Cognitive/Neuro/Behavioral WDL    Cognitive/Neuro/Behavioral WDL WDL

## 2022-10-28 PROCEDURE — 250N000013 HC RX MED GY IP 250 OP 250 PS 637: Performed by: EMERGENCY MEDICINE

## 2022-10-28 RX ORDER — METHOCARBAMOL 500 MG/1
500 TABLET, FILM COATED ORAL 4 TIMES DAILY PRN
Qty: 30 TABLET | Refills: 0 | Status: SHIPPED | OUTPATIENT
Start: 2022-10-28

## 2022-10-28 RX ORDER — METHYLPREDNISOLONE 4 MG
TABLET, DOSE PACK ORAL
Qty: 21 TABLET | Refills: 0 | Status: SHIPPED | OUTPATIENT
Start: 2022-10-28

## 2022-10-28 RX ORDER — HYDROCODONE BITARTRATE AND ACETAMINOPHEN 5; 325 MG/1; MG/1
1 TABLET ORAL EVERY 6 HOURS PRN
Qty: 10 TABLET | Refills: 0 | Status: SHIPPED | OUTPATIENT
Start: 2022-10-28 | End: 2022-10-31

## 2022-10-28 RX ORDER — HYDROCODONE BITARTRATE AND ACETAMINOPHEN 5; 325 MG/1; MG/1
1 TABLET ORAL ONCE
Status: COMPLETED | OUTPATIENT
Start: 2022-10-28 | End: 2022-10-28

## 2022-10-28 RX ADMIN — HYDROCODONE BITARTRATE AND ACETAMINOPHEN 1 TABLET: 5; 325 TABLET ORAL at 00:16

## 2022-10-28 NOTE — ED PROVIDER NOTES
EMERGENCY DEPARTMENT ENCOUNTER      NAME: Dorothy Mitchell  AGE: 32 year old female  YOB: 1990  MRN: [id number]  EVALUATION DATE & TIME: 10/27/2022  9:26 PM    PCP: Edelmira Antoine [facility]    ED PROVIDER: Justus Duncan D.O.      Chief Complaint   Patient presents with     Back Pain       FINAL IMPRESSION:  1. Low back pain radiating to right leg        ED COURSE & MEDICAL DECISION MAKIN:55 PM I met with the patient to gather history and to perform my initial exam. I discussed the plan for care while in the Emergency Department. PPE includes procedure mask, gloves.  11:23 PM I reassessed patient. She reports medication was administered less than 30 min ago and has had no change in symptoms thus far. I updated her on imaging results.   12:00 AM I reassessed patient. Patient reports minimal improvement of her symptoms. We discussed plans for discharge including supportive cares, symptomatic treatment, outpatient follow up, and reasons to return to the emergency department.           Medical Decision Making    Supplemental history from: N/A    External Record(s) Reviewed: N/A    Differential Diagnosis: See MDM charting for differential considered.     I performed an independent interpretation of the: N/A    Discussed with radiology regarding test interpretation: N/A    Discussion of management with another provider: N/A    The following testing was considered but ultimately not selected: None     I considered prescription management with: N/A    The patient's care impacted: None    Consideration of Admission/Observation: N/A - Patient admitted or discharged without consideration for admission    Care significantly affected by Social Determinants of Health including: N/A      Pertinent Labs & Imaging studies reviewed. (See chart for details)  32 year old female presents to the Emergency Department for evaluation of low back pain with radiation to the right leg.  Patient's pain was paraspinal in  "nature on exam with some muscle spasm.  She did have pain to that right leg, but no definitive weakness, normal DTRs, and no obvious sensory deficits on that side.  Patient had no history of trauma, no fevers, no personal history of cancer.  Clinically there is no concern for epidural abscess, epidural hematoma, fracture, or other space-occupying lesion.  X-rays were unremarkable.  This time without obvious neurologic dysfunction I do not believe she requires MRI imaging.  Patient had minimal improvement Valium and Toradol in the emergency department.  Will treat with pain control, muscle relaxants, steroids, and have referred for spine follow-up with the spine clinic.  Patient was agreeable with this plan.  Return precautions discussed.    At the conclusion of the encounter I discussed the results of all of the tests and the disposition. The questions were answered. The patient or family acknowledged understanding and was agreeable with the care plan.      HPI    Patient information was obtained from: Patient     Use of : N/A      Dorothy Mitchell is a 32 year old female with a history of reflex sympathetic dystrophy (right lower extremity), endometriosis, severe opioid use disorder, s/p appendectomy (2012) who presents to the ED by walk in for evaluation of lower back pain.     Patient reports persistent right sided lower back pain that began 2 days ago (10/25) when she woke. Patient describes the pain as initially soreness and thought she had slept on her back wrong, and she was able to go about her day. Patient reports the back pain has worsened since then, and last night she was unable to sleep due to tossing and turning from the pain. Patient describes the pain as occasionally radiating into her right hip and down the anterior of her right thigh. The pain feels like \"electrocution\" with certain movements and occasionally if patient is putting weight on her right foot, it will start shaking. Patient " is still able to walk, but reports when she has the electric sensation, it feels like her leg will give out. Patient denies any injury or fall that could contribute to her symptoms. Patient additionally reports feeling dizzy. Patient denies bowel or bladder issues, pain on wiping after defecation, fever, history of cancer, cough, congestion, shortness of breath, nausea, vomiting, diarrhea, dysuria, hematuria, or any other complaints at this time. She reports a surgical history of appendectomy, tonsillectomy, and ovarian cyst removal. She reports a history of hypothyroidism.      REVIEW OF SYSTEMS  Constitutional:  Denies fever, chills, weight loss or weakness  Eyes:  No pain, discharge, redness   HENT:  Denies sore throat, ear pain, congestion   Respiratory: No SOB, wheeze or cough  Cardiovascular:  No CP, palpitations  GI:  Denies abdominal pain, nausea, vomiting, diarrhea  Musculoskeletal:  Denies any new muscle/joint pain, swelling or loss of function.   Skin:  Denies rash, pallor   Neurologic:  Positive for right lower back pain (radiating down leg), occasional right leg weakness  Lymph: Denies swollen nodes    All other systems negative unless noted in HPI.      PAST MEDICAL HISTORY:  Past Medical History:   Diagnosis Date     Acute opioid withdrawal (H)      Anemia      Anxiety disorder      Blood clotting disorder (H)     pregnancy     Colloid cyst of third ventricle (H)      Depression      Depression affecting pregnancy in second trimester, antepartum      High risk pregnancy due to maternal drug abuse (H)      Low back pain of over 3 months duration      Miscarriage     X5     Ovarian cyst      RSD lower limb     Right foot     RSD lower limb, right 10/26/2015       PAST SURGICAL HISTORY:  Past Surgical History:   Procedure Laterality Date     APPENDECTOMY  2011     APPENDECTOMY OPEN       DILATION AND CURETTAGE       HC DILATION/CURETTAGE DIAG/THER NON OB      Description: Dilation And Curettage;   "Recorded: 05/02/2012;     HC REMOVAL OF TONSILS,<13 Y/O      Description: Tonsillectomy;  Recorded: 05/02/2012;     HC REMOVAL OF TONSILS,<13 Y/O      Description: Tonsillectomy;  Recorded: 11/20/2012;     LAPAROSCOPY DIAGNOSTIC (GENERAL)  10/30/15     TONSILLECTOMY       TONSILLECTOMY       ZC APPENDECTOMY      Description: Appendectomy;  Recorded: 05/02/2012;     ZZC APPENDECTOMY      Description: Appendectomy;  Recorded: 11/20/2012;         CURRENT MEDICATIONS:    No current facility-administered medications for this encounter.     Current Outpatient Medications   Medication     HYDROcodone-acetaminophen (NORCO) 5-325 MG tablet     methocarbamol (ROBAXIN) 500 MG tablet     methylPREDNISolone (MEDROL DOSEPAK) 4 MG tablet therapy pack         ALLERGIES:  Allergies   Allergen Reactions     No Known Drug Allergies Unknown     Other Environmental Allergy Swelling     Metal       FAMILY HISTORY:  Family History   Problem Relation Age of Onset     Diabetes Mother      Heart Disease Father      Depression Maternal Grandmother      Hypertension Maternal Grandmother      Anxiety Disorder Maternal Grandmother      Hypertension Maternal Grandfather      Diabetes Brother        SOCIAL HISTORY:  Social History     Socioeconomic History     Marital status: Single     Spouse name: None     Number of children: 1     Years of education: None     Highest education level: None   Tobacco Use     Smoking status: Never     Smokeless tobacco: Never   Substance and Sexual Activity     Alcohol use: No     Comment: weekends     Drug use: Yes     Types: Oxycodone     Sexual activity: Yes     Partners: Male     Birth control/protection: None   Social History Narrative    Lives with family.        VITALS:  Patient Vitals for the past 24 hrs:   BP Temp Pulse Resp SpO2 Height Weight   10/27/22 2145 116/65 -- 88 -- 100 % -- --   10/27/22 1846 137/81 97.6  F (36.4  C) 89 16 99 % 1.803 m (5' 11\") 72.6 kg (160 lb)       PHYSICAL EXAM    VITAL " "SIGNS: /65   Pulse 88   Temp 97.6  F (36.4  C)   Resp 16   Ht 1.803 m (5' 11\")   Wt 72.6 kg (160 lb)   LMP 10/03/2022   SpO2 100%   BMI 22.32 kg/m      General Appearance: Well-appearing, well-nourished, no acute distress  Head:  Normocephalic, without obvious abnormality, atraumatic  Eyes:  PERRL, conjunctiva/corneas clear, EOM's intact,  ENT:  Lips, mucosa, and tongue normal, membranes are moist without pallor  Neck:  Normal ROM, symmetrical, trachea midline    Cardio:  Regular rate and rhythm, no murmur, rub or gallop, 2+ pulses symmetric in all extremities  Pulm:  Clear to auscultation bilaterally, respirations unlabored,  Back:  Right lumbar perispinal tenderness, no midline tenderness, no left sided tenderness. 5/5 motor tone.   Abdomen:  Soft, non-tender, no rebound or guarding.  Musculoskeletal: No edema, no cyanosis. Bilateral lower extremities: 2/4 DTRs bilaterally. Bilateral sensation grossly intact in both lower extremities.   Integument:  Warm, Dry, No erythema, No rash.    Neurologic:  Alert & oriented.  No focal deficits appreciated.  Ambulatory.  Psychiatric:  Affect normal, Judgment normal, Mood normal.        LABS  Results for orders placed or performed during the hospital encounter of 10/27/22 (from the past 24 hour(s))   Lumbar spine XR, 2-3 views    Narrative    EXAM: XR LUMBAR SPINE 2/3 VIEWS  LOCATION: [facility]  DATE/TIME: 10/27/2022 10:38 PM    INDICATION: Low back pain  COMPARISON: None.  TECHNIQUE: CR Lumbar Spine.      Impression    IMPRESSION: 5 lumbar type vertebra. Mild to moderate levoscoliosis centered at L2-3. No radiographic evidence for acute fracture or subluxation.         RADIOLOGY  Lumbar spine XR, 2-3 views   Final Result   IMPRESSION: 5 lumbar type vertebra. Mild to moderate levoscoliosis centered at L2-3. No radiographic evidence for acute fracture or subluxation.             MEDICATIONS GIVEN IN THE EMERGENCY:  Medications "   diazepam (VALIUM) tablet 5 mg (5 mg Oral Given 10/27/22 2210)   ketorolac (TORADOL) injection 30 mg (30 mg Intramuscular Given 10/27/22 2214)   HYDROcodone-acetaminophen (NORCO) 5-325 MG per tablet 1 tablet (1 tablet Oral Given 10/28/22 0016)       NEW PRESCRIPTIONS STARTED AT TODAY'S ER VISIT  Discharge Medication List as of 10/28/2022 12:17 AM      START taking these medications    Details   methocarbamol (ROBAXIN) 500 MG tablet Take 1 tablet (500 mg) by mouth 4 times daily as needed for muscle spasms, Disp-30 tablet, R-0, Local Print      methylPREDNISolone (MEDROL DOSEPAK) 4 MG tablet therapy pack Follow Package Directions, Disp-21 tablet, R-0, Local Print              I, Sonal Hector, am serving as a scribe to document services personally performed by Justus Duncan DO, based on my observations and the provider's statements to me.  I, Justus Duncan DO, attest that Sonal Hector is acting in a scribe capacity, has observed my performance of the services and has documented them in accordance with my direction.       Justus Duncan D.O.  Emergency Medicine  [facility] EMERGENCY ROOM  [address] [address] [phone]  Dept: [phone]       Justus Duncan DO  10/28/22 0122

## 2022-10-28 NOTE — TELEPHONE ENCOUNTER
SPINE PATIENTS - NEW PROTOCOL PREVISIT    RECORDS RECEIVED FROM: Internal   REASON FOR VISIT: low back pain radiating to (R) leg   Date of Appt: 11/17/2022   NOTES (FOR ALL VISITS) STATUS DETAILS   OFFICE NOTE from referring provider N/A    OFFICE NOTE from other specialist N/A    DISCHARGE SUMMARY from hospital N/A    DISCHARGE REPORT from ER Internal 10/27/2022 [facility] ED   EMG REPORT N/A    MEDICATION LIST N/A    IMAGING  (FOR ALL VISITS)     MRI (HEAD, NECK, SPINE) N/A    XRAY (SPINE) *NEUROSURGERY* Internal 10/27/2022 lumbar spine   CT (HEAD, NECK, SPINE) N/A

## 2022-11-06 NOTE — ED TRIAGE NOTES
The patient presents to the ED after falling while sleepwalking sometime between 2 am and 4 am last night. The patient has a laceration to the right side of her head. She does not remember falling. The patient reports she woke up on the floor. She denies taking any blood thinning medication. The patient also reports bruising to her lower right side.   
no

## 2022-11-17 ENCOUNTER — PRE VISIT (OUTPATIENT)
Dept: NEUROSURGERY | Facility: CLINIC | Age: 32
End: 2022-11-17

## 2023-01-03 ENCOUNTER — E-VISIT (OUTPATIENT)
Dept: URGENT CARE | Facility: CLINIC | Age: 33
End: 2023-01-03
Payer: COMMERCIAL

## 2023-01-03 DIAGNOSIS — B37.31 CANDIDAL VULVOVAGINITIS: Primary | ICD-10-CM

## 2023-01-03 PROCEDURE — 99421 OL DIG E/M SVC 5-10 MIN: CPT | Performed by: EMERGENCY MEDICINE

## 2023-01-03 RX ORDER — FLUCONAZOLE 150 MG/1
150 TABLET ORAL ONCE
Qty: 2 TABLET | Refills: 0 | Status: SHIPPED | OUTPATIENT
Start: 2023-01-03 | End: 2023-01-03

## 2023-01-05 ENCOUNTER — E-VISIT (OUTPATIENT)
Dept: URGENT CARE | Facility: URGENT CARE | Age: 33
End: 2023-01-05
Payer: COMMERCIAL

## 2023-01-05 DIAGNOSIS — N89.8 VAGINAL DISCHARGE: Primary | ICD-10-CM

## 2023-01-05 PROCEDURE — 99421 OL DIG E/M SVC 5-10 MIN: CPT | Performed by: PHYSICIAN ASSISTANT

## 2023-01-05 NOTE — PATIENT INSTRUCTIONS
Thank you for choosing us for your care. Given your symptoms, I would like you to do a lab-only visit to determine what is causing them.  I have placed the orders.  Please schedule an appointment with the lab right here in [email or web address], or call [phone].  I will let you know when the results are back and next steps to take.

## 2023-02-13 ENCOUNTER — TRANSFERRED RECORDS (OUTPATIENT)
Dept: HEALTH INFORMATION MANAGEMENT | Facility: CLINIC | Age: 33
End: 2023-02-13

## 2023-02-15 ENCOUNTER — TRANSFERRED RECORDS (OUTPATIENT)
Dept: HEALTH INFORMATION MANAGEMENT | Facility: CLINIC | Age: 33
End: 2023-02-15

## 2023-04-14 ENCOUNTER — TRANSCRIBE ORDERS (OUTPATIENT)
Dept: OTHER | Age: 33
End: 2023-04-14

## 2023-04-14 DIAGNOSIS — I67.1 INTERNAL CAROTID ANEURYSM: Primary | ICD-10-CM

## 2023-04-21 ENCOUNTER — TELEPHONE (OUTPATIENT)
Dept: NEUROLOGY | Facility: CLINIC | Age: 33
End: 2023-04-21
Payer: COMMERCIAL

## 2023-04-21 NOTE — TELEPHONE ENCOUNTER
Health Call Center    Phone Message    May a detailed message be left on voicemail: yes     Reason for Call: Appointment Intake    Referring Provider Name: Dr. Carolyn Otto @ [facility]  Diagnosis and/or Symptoms: Internal carotid aneurysm    Dominga called to inquire if Pt was scheduled as writer look deeper it looks like a in basket was sent back to referring. Please review and advise if this needs to be sent to neurosurgery for scheduling.    Please call Dominga back at [phone] with any question.    Action Taken: Message routed to:  [facility] ([facility] Neurology    Travel Screening: Not Applicable

## 2023-05-08 NOTE — TELEPHONE ENCOUNTER
Action 5/8/23 MV 10.20am   Action Taken Imaging request faxed to:    [facility]    [facility]     Action 5/16/23 MV 9.36am   Action Taken Images resolved in PACS           RECORDS RECEIVED FROM: external   REASON FOR VISIT: internal carotid aneurysm   Date of Appt: 5/23/23   NOTES (FOR ALL VISITS) STATUS DETAILS   OFFICE NOTE from referring provider Received Dr Carolyn Otto @ [facility]:  2/13/23   MEDICATION LIST Received    IMAGING  (FOR ALL VISITS)     MRI (HEAD, NECK, SPINE) PACS [facility]:  MRI Brain 2/15/23  [facility] Brain 2/15/23    [facility]:  MRI Brain 1/7/15  MRI Brain 12/23/11    [facility]:  MRI Brain 4/9/12   CT PACS [facility]:  CT Head 10/11/21    [facility]:  CT Head 12/22/11

## 2023-05-22 ASSESSMENT — ENCOUNTER SYMPTOMS
BOWEL INCONTINENCE: 0
STIFFNESS: 0
JOINT SWELLING: 0
BLOOD IN STOOL: 1
ARTHRALGIAS: 0
BACK PAIN: 1
TINGLING: 1
EYE IRRITATION: 1
HEADACHES: 1
MUSCLE CRAMPS: 0
EYE PAIN: 1
VOMITING: 0
CONSTIPATION: 1
BLOATING: 0
MUSCLE WEAKNESS: 0
ABDOMINAL PAIN: 0
DIARRHEA: 0
RECTAL PAIN: 0
HEARTBURN: 0
JAUNDICE: 0
NECK PAIN: 0
NAUSEA: 0
MEMORY LOSS: 1
MYALGIAS: 0

## 2023-05-23 ENCOUNTER — PRE VISIT (OUTPATIENT)
Dept: NEUROSURGERY | Facility: CLINIC | Age: 33
End: 2023-05-23

## 2023-05-23 ENCOUNTER — OFFICE VISIT (OUTPATIENT)
Dept: NEUROSURGERY | Facility: CLINIC | Age: 33
End: 2023-05-23
Payer: COMMERCIAL

## 2023-05-23 VITALS
TEMPERATURE: 97.8 F | WEIGHT: 158 LBS | DIASTOLIC BLOOD PRESSURE: 77 MMHG | OXYGEN SATURATION: 97 % | HEART RATE: 86 BPM | BODY MASS INDEX: 22.12 KG/M2 | SYSTOLIC BLOOD PRESSURE: 127 MMHG | RESPIRATION RATE: 16 BRPM | HEIGHT: 71 IN

## 2023-05-23 DIAGNOSIS — H02.409 PTOSIS: Primary | ICD-10-CM

## 2023-05-23 DIAGNOSIS — I67.1 CEREBRAL ANEURYSM WITHOUT RUPTURE: ICD-10-CM

## 2023-05-23 PROCEDURE — 99207 PR NO BILLABLE SERVICE THIS VISIT: CPT | Performed by: RADIOLOGY

## 2023-05-23 RX ORDER — BUPROPION HYDROCHLORIDE 300 MG/1
1 TABLET ORAL
COMMUNITY
Start: 2023-04-24

## 2023-05-23 RX ORDER — SPIRONOLACTONE 50 MG/1
1 TABLET, FILM COATED ORAL
COMMUNITY
Start: 2023-05-04

## 2023-05-23 RX ORDER — LEVOTHYROXINE SODIUM 125 UG/1
1 TABLET ORAL
COMMUNITY
Start: 2023-04-24

## 2023-05-23 RX ORDER — FLUOXETINE 40 MG/1
40 CAPSULE ORAL DAILY
COMMUNITY
Start: 2023-04-24

## 2023-05-23 NOTE — LETTER
5/23/2023       RE: Dorothy Mitchell  [address] [address]       Dear Colleague,    Thank you for referring your patient, Dorothy Mitchell, to the [facility] NEUROSURGERY CLINIC [place] at [facility]. Please see a copy of my visit note below.                                                                         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 She says the left eye pupil has been larger than the right consistently since about October 2022. Intermittently, she notes that her eyelid seems to get stuck, or open and close more slowly than on the right since that time. She denies other symptoms including numbness, tingling, rash, ear pain, hearing loss, smile asymmetry, slurred speech, dysphagia, or jaw claudication. She had chickenpox and has not had shingles or the vaccine. She denies limb weakness, numbness, or tingling. She is a never smoker, not hypertensive, and has no personal or family history of brain aneurysm or subarachnoid hemorrhage.      Past Medical History:  Past Medical History:   Diagnosis Date    Acute opioid withdrawal (H)     Anemia     Anxiety disorder     Blood clotting disorder (H)     pregnancy    Colloid cyst of third ventricle (H)     Depression     Depression affecting pregnancy in second trimester, antepartum     High risk pregnancy due to maternal drug abuse (H)     Low  back pain of over 3 months duration     Miscarriage     X5    Ovarian cyst     RSD lower limb     Right foot    RSD lower limb, right 10/26/2015       Past Surgical History:  Past Surgical History:   Procedure Laterality Date    APPENDECTOMY  2011    APPENDECTOMY OPEN      DILATION AND CURETTAGE      HC DILATION/CURETTAGE DIAG/THER NON OB      Description: Dilation And Curettage;  Recorded: 05/02/2012;    HC REMOVAL OF TONSILS,<13 Y/O      Description: Tonsillectomy;  Recorded: 05/02/2012;    HC REMOVAL OF TONSILS,<13 Y/O      Description: Tonsillectomy;  Recorded: 11/20/2012;    LAPAROSCOPY DIAGNOSTIC (GENERAL)  10/30/15    TONSILLECTOMY      TONSILLECTOMY      [facility] APPENDECTOMY      Description: Appendectomy;  Recorded: 05/02/2012;    Z APPENDECTOMY      Description: Appendectomy;  Recorded: 11/20/2012;       Social History:  Social History     Socioeconomic History    Marital status: Single     Spouse name: Not on file    Number of children: 1    Years of education: Not on file    Highest education level: Not on file   Occupational History    Not on file   Tobacco Use    Smoking status: Never    Smokeless tobacco: Never   Vaping Use    Vaping status: Not on file   Substance and Sexual Activity    Alcohol use: No     Comment: weekends    Drug use: Yes     Types: Oxycodone    Sexual activity: Yes     Partners: Male     Birth control/protection: None   Other Topics Concern    Not on file   Social History Narrative    Lives with family.      Social Determinants of Health     Financial Resource Strain: Not on file   Food Insecurity: Not on file   Transportation Needs: Not on file   Physical Activity: Not on file   Stress: Not on file   Social Connections: Not on file   Intimate Partner Violence: Not on file   Housing Stability: Not on file       Family History:  Family History   Problem Relation Age of Onset    Diabetes Mother     Heart Disease Father     Depression Maternal Grandmother     Hypertension Maternal  "Grandmother     Anxiety Disorder Maternal Grandmother     Hypertension Maternal Grandfather     Diabetes Brother        Home Medications:  Current Outpatient Medications   Medication    buPROPion (WELLBUTRIN XL) 300 MG 24 hr tablet    FLUoxetine (PROZAC) 40 MG capsule    levothyroxine (SYNTHROID/LEVOTHROID) 125 MCG tablet    spironolactone (ALDACTONE) 50 MG tablet    methocarbamol (ROBAXIN) 500 MG tablet    methylPREDNISolone (MEDROL DOSEPAK) 4 MG tablet therapy pack     No current facility-administered medications for this visit.       Allergies:  Allergies   Allergen Reactions    No Known Drug Allergy Unknown    Other Environmental Allergy Swelling     Metal       Physical Examination:  /77   Pulse 86   Temp 97.8  F (36.6  C)   Resp 16   Ht 1.803 m (5' 11\")   Wt 71.7 kg (158 lb)   SpO2 97%   BMI 22.04 kg/m    General: Awake, alert, no acute distress  CVS: Regular rate & rhythm  RS: CTAB  Abdomen: Soft, non-tender  Neurological:  Awake, alert and oriented x3  Cranial Nerves: VFF, right pupil is round, 3 mm, dilates in dim light, and constricts briskly; left pupil is 6 mm, dilates minimally in dim light, constricts briskly. EOMI, left eye reduced blink rate/lid lag, no ptosis with resting facial expression. Facial sensations intact, hearing intact to conversation, palate elevates to midline, shoulder shrug strong B/L, tongue movements intact   Motor: Tone normal. Strenght 5/5 throughout  Sensations: Intact B/L to light touch  Cerebellar signs: FTN/HST intact B/L  Gait: Normal.     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 Risk of rupture over 5 years is <1%, and suspect rupture would not cause SAH due to extradural location.    L ptosis and midriasis- no clear cause of partial third nerve " palsy identified with current imaging, low suspicion for myasthenia with pupillary involvement and unilateral ptosis, consider post-viral inflammatory neuritis, but will defer to General Neurology for evaluation.    Plan:  MRI of the brain with and without contrast with thin cuts through the skull base to look at cranial nerves  Referral to general neurology to address L eye ptosis and anisocoria  Repeat MRA in 5 years    Patient seen and discussed with the attending, Dr. Arreola.          Again, thank you for allowing me to participate in the care of your patient.      Sincerely,    Emigdio Arreola MD

## 2023-05-23 NOTE — PROGRESS NOTES
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 She says the left eye pupil has been larger than the right consistently since about October 2022. Intermittently, she notes that her eyelid seems to get stuck, or open and close more slowly than on the right since that time. She denies other symptoms including numbness, tingling, rash, ear pain, hearing loss, smile asymmetry, slurred speech, dysphagia, or jaw claudication. She had chickenpox and has not had shingles or the vaccine. She denies limb weakness, numbness, or tingling. She is a never smoker, not hypertensive, and has no personal or family history of brain aneurysm or subarachnoid hemorrhage.      Past Medical History:  Past Medical History:   Diagnosis Date     Acute opioid withdrawal (H)      Anemia      Anxiety disorder      Blood clotting disorder (H)     pregnancy     Colloid cyst of third ventricle (H)      Depression      Depression affecting pregnancy in second trimester, antepartum      High risk pregnancy due to maternal drug abuse (H)      Low back pain of over 3 months duration      Miscarriage     X5     Ovarian cyst      RSD lower limb     Right foot     RSD lower limb, right 10/26/2015       Past Surgical History:  Past Surgical History:   Procedure Laterality Date     APPENDECTOMY  2011     APPENDECTOMY OPEN       DILATION AND CURETTAGE       HC  DILATION/CURETTAGE DIAG/THER NON OB      Description: Dilation And Curettage;  Recorded: 05/02/2012;     HC REMOVAL OF TONSILS,<13 Y/O      Description: Tonsillectomy;  Recorded: 05/02/2012;     HC REMOVAL OF TONSILS,<13 Y/O      Description: Tonsillectomy;  Recorded: 11/20/2012;     LAPAROSCOPY DIAGNOSTIC (GENERAL)  10/30/15     TONSILLECTOMY       TONSILLECTOMY       [facility] APPENDECTOMY      Description: Appendectomy;  Recorded: 05/02/2012;     [facility] APPENDECTOMY      Description: Appendectomy;  Recorded: 11/20/2012;       Social History:  Social History     Socioeconomic History     Marital status: Single     Spouse name: Not on file     Number of children: 1     Years of education: Not on file     Highest education level: Not on file   Occupational History     Not on file   Tobacco Use     Smoking status: Never     Smokeless tobacco: Never   Vaping Use     Vaping status: Not on file   Substance and Sexual Activity     Alcohol use: No     Comment: weekends     Drug use: Yes     Types: Oxycodone     Sexual activity: Yes     Partners: Male     Birth control/protection: None   Other Topics Concern     Not on file   Social History Narrative    Lives with family.      Social Determinants of Health     Financial Resource Strain: Not on file   Food Insecurity: Not on file   Transportation Needs: Not on file   Physical Activity: Not on file   Stress: Not on file   Social Connections: Not on file   Intimate Partner Violence: Not on file   Housing Stability: Not on file       Family History:  Family History   Problem Relation Age of Onset     Diabetes Mother      Heart Disease Father      Depression Maternal Grandmother      Hypertension Maternal Grandmother      Anxiety Disorder Maternal Grandmother      Hypertension Maternal Grandfather      Diabetes Brother        Home Medications:  Current Outpatient Medications   Medication     buPROPion (WELLBUTRIN XL) 300 MG 24 hr tablet     FLUoxetine (PROZAC) 40 MG capsule      "levothyroxine (SYNTHROID/LEVOTHROID) 125 MCG tablet     spironolactone (ALDACTONE) 50 MG tablet     methocarbamol (ROBAXIN) 500 MG tablet     methylPREDNISolone (MEDROL DOSEPAK) 4 MG tablet therapy pack     No current facility-administered medications for this visit.       Allergies:  Allergies   Allergen Reactions     No Known Drug Allergy Unknown     Other Environmental Allergy Swelling     Metal       Physical Examination:  /77   Pulse 86   Temp 97.8  F (36.6  C)   Resp 16   Ht 1.803 m (5' 11\")   Wt 71.7 kg (158 lb)   SpO2 97%   BMI 22.04 kg/m    General: Awake, alert, no acute distress  CVS: Regular rate & rhythm  RS: CTAB  Abdomen: Soft, non-tender  Neurological:  Awake, alert and oriented x3  Cranial Nerves: VFF, right pupil is round, 3 mm, dilates in dim light, and constricts briskly; left pupil is 6 mm, dilates minimally in dim light, constricts briskly. EOMI, left eye reduced blink rate/lid lag, no ptosis with resting facial expression. Facial sensations intact, hearing intact to conversation, palate elevates to midline, shoulder shrug strong B/L, tongue movements intact   Motor: Tone normal. Strenght 5/5 throughout  Sensations: Intact B/L to light touch  Cerebellar signs: FTN/HST intact B/L  Gait: Normal.     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 Risk of rupture over 5 years is <1%, and suspect rupture would not cause SAH due to extradural location.    L ptosis and midriasis- no clear cause of partial third nerve palsy identified with current imaging, low suspicion for myasthenia with pupillary involvement and unilateral ptosis, consider post-viral inflammatory neuritis, but will defer to General Neurology for evaluation.    Plan:  MRI of the brain with and without contrast with " thin cuts through the skull base to look at cranial nerves  Referral to general neurology to address L eye ptosis and anisocoria  Repeat MRA in 5 years    Patient seen and discussed with the attending, Dr. Arreola.    Claire Henriquez MD  Endovascular Surgical Neuroradiology Fellow, PGY-6  [phone]

## 2023-05-23 NOTE — PATIENT INSTRUCTIONS
Please follow up in 5 years with Dr. Arreola with MRA prior. We will put in a referral for general neurology and you will need to complete MRI prior to this appointment.     Stroke & Endovascular RN Care Coordinators:    Sonal Cruz, RN, BSN  Peg Jacobson, RN, CNRN, SCRN    If you have any questions please contact the RN Care Coordinators at [phone], option 1.     After business hours call the  at [phone] and have the Neuro-Interventional Fellow paged.    Thank you for choosing [facility] for your health care needs.

## 2023-07-22 ENCOUNTER — HEALTH MAINTENANCE LETTER (OUTPATIENT)
Age: 33
End: 2023-07-22

## 2024-02-22 ENCOUNTER — HOSPITAL ENCOUNTER (EMERGENCY)
Facility: CLINIC | Age: 34
Discharge: HOME | End: 2024-02-22
Attending: EMERGENCY MEDICINE | Admitting: EMERGENCY MEDICINE
Payer: COMMERCIAL

## 2024-02-22 VITALS
BODY MASS INDEX: 21.62 KG/M2 | RESPIRATION RATE: 20 BRPM | WEIGHT: 155 LBS | TEMPERATURE: 98.7 F | DIASTOLIC BLOOD PRESSURE: 69 MMHG | SYSTOLIC BLOOD PRESSURE: 112 MMHG | HEART RATE: 93 BPM | OXYGEN SATURATION: 98 %

## 2024-02-22 DIAGNOSIS — T40.725A: ICD-10-CM

## 2024-02-22 LAB
ALBUMIN UR-MCNC: 10 MG/DL
AMPHETAMINES UR QL SCN: ABNORMAL
ANION GAP SERPL CALCULATED.3IONS-SCNC: 12 MMOL/L (ref 7–15)
APPEARANCE UR: ABNORMAL
BARBITURATES UR QL SCN: ABNORMAL
BASOPHILS # BLD AUTO: 0 10E3/UL (ref 0–0.2)
BASOPHILS NFR BLD AUTO: 0 %
BENZODIAZ UR QL SCN: ABNORMAL
BILIRUB UR QL STRIP: NEGATIVE
BUN SERPL-MCNC: 8.2 MG/DL (ref 6–20)
BZE UR QL SCN: ABNORMAL
CALCIUM SERPL-MCNC: 9.2 MG/DL (ref 8.6–10)
CANNABINOIDS UR QL SCN: ABNORMAL
CHLORIDE SERPL-SCNC: 99 MMOL/L (ref 98–107)
COLOR UR AUTO: YELLOW
CREAT SERPL-MCNC: 0.64 MG/DL (ref 0.51–0.95)
DEPRECATED HCO3 PLAS-SCNC: 26 MMOL/L (ref 22–29)
EGFRCR SERPLBLD CKD-EPI 2021: >90 ML/MIN/1.73M2
EOSINOPHIL # BLD AUTO: 0.2 10E3/UL (ref 0–0.7)
EOSINOPHIL NFR BLD AUTO: 1 %
ERYTHROCYTE [DISTWIDTH] IN BLOOD BY AUTOMATED COUNT: 12.5 % (ref 10–15)
ETHANOL SERPL-MCNC: <0.01 G/DL
FENTANYL UR QL: ABNORMAL
FLUAV RNA SPEC QL NAA+PROBE: NEGATIVE
FLUBV RNA RESP QL NAA+PROBE: NEGATIVE
GLUCOSE SERPL-MCNC: 68 MG/DL (ref 70–99)
GLUCOSE UR STRIP-MCNC: NEGATIVE MG/DL
HCG SERPL QL: NEGATIVE
HCT VFR BLD AUTO: 39.2 % (ref 35–47)
HGB BLD-MCNC: 12.5 G/DL (ref 11.7–15.7)
HGB UR QL STRIP: ABNORMAL
IMM GRANULOCYTES # BLD: 0.1 10E3/UL
IMM GRANULOCYTES NFR BLD: 1 %
KETONES UR STRIP-MCNC: NEGATIVE MG/DL
LEUKOCYTE ESTERASE UR QL STRIP: ABNORMAL
LYMPHOCYTES # BLD AUTO: 1.9 10E3/UL (ref 0.8–5.3)
LYMPHOCYTES NFR BLD AUTO: 15 %
MCH RBC QN AUTO: 26.5 PG (ref 26.5–33)
MCHC RBC AUTO-ENTMCNC: 31.9 G/DL (ref 31.5–36.5)
MCV RBC AUTO: 83 FL (ref 78–100)
MONOCYTES # BLD AUTO: 0.7 10E3/UL (ref 0–1.3)
MONOCYTES NFR BLD AUTO: 6 %
MUCOUS THREADS #/AREA URNS LPF: PRESENT /LPF
NEUTROPHILS # BLD AUTO: 9.8 10E3/UL (ref 1.6–8.3)
NEUTROPHILS NFR BLD AUTO: 77 %
NITRATE UR QL: NEGATIVE
NRBC # BLD AUTO: 0 10E3/UL
NRBC BLD AUTO-RTO: 0 /100
OPIATES UR QL SCN: ABNORMAL
PCP QUAL URINE (ROCHE): ABNORMAL
PH UR STRIP: 6.5 [PH] (ref 5–7)
PLATELET # BLD AUTO: 329 10E3/UL (ref 150–450)
POTASSIUM SERPL-SCNC: 3.9 MMOL/L (ref 3.4–5.3)
RBC # BLD AUTO: 4.71 10E6/UL (ref 3.8–5.2)
RBC URINE: 100 /HPF
RSV RNA SPEC NAA+PROBE: NEGATIVE
SARS-COV-2 RNA RESP QL NAA+PROBE: NEGATIVE
SODIUM SERPL-SCNC: 137 MMOL/L (ref 135–145)
SP GR UR STRIP: 1.02 (ref 1–1.03)
SQUAMOUS EPITHELIAL: 13 /HPF
UROBILINOGEN UR STRIP-MCNC: <2 MG/DL
WBC # BLD AUTO: 12.6 10E3/UL (ref 4–11)
WBC URINE: 7 /HPF

## 2024-02-22 PROCEDURE — 82077 ASSAY SPEC XCP UR&BREATH IA: CPT | Performed by: EMERGENCY MEDICINE

## 2024-02-22 PROCEDURE — 81001 URINALYSIS AUTO W/SCOPE: CPT | Performed by: EMERGENCY MEDICINE

## 2024-02-22 PROCEDURE — 84703 CHORIONIC GONADOTROPIN ASSAY: CPT | Performed by: EMERGENCY MEDICINE

## 2024-02-22 PROCEDURE — 36415 COLL VENOUS BLD VENIPUNCTURE: CPT | Performed by: EMERGENCY MEDICINE

## 2024-02-22 PROCEDURE — 87086 URINE CULTURE/COLONY COUNT: CPT | Performed by: EMERGENCY MEDICINE

## 2024-02-22 PROCEDURE — 85025 COMPLETE CBC W/AUTO DIFF WBC: CPT | Performed by: EMERGENCY MEDICINE

## 2024-02-22 PROCEDURE — 87637 SARSCOV2&INF A&B&RSV AMP PRB: CPT | Performed by: EMERGENCY MEDICINE

## 2024-02-22 PROCEDURE — 80048 BASIC METABOLIC PNL TOTAL CA: CPT | Performed by: EMERGENCY MEDICINE

## 2024-02-22 PROCEDURE — 80307 DRUG TEST PRSMV CHEM ANLYZR: CPT | Performed by: EMERGENCY MEDICINE

## 2024-02-22 PROCEDURE — 99283 EMERGENCY DEPT VISIT LOW MDM: CPT

## 2024-02-22 ASSESSMENT — COLUMBIA-SUICIDE SEVERITY RATING SCALE - C-SSRS
1. IN THE PAST MONTH, HAVE YOU WISHED YOU WERE DEAD OR WISHED YOU COULD GO TO SLEEP AND NOT WAKE UP?: NO
2. HAVE YOU ACTUALLY HAD ANY THOUGHTS OF KILLING YOURSELF IN THE PAST MONTH?: NO
6. HAVE YOU EVER DONE ANYTHING, STARTED TO DO ANYTHING, OR PREPARED TO DO ANYTHING TO END YOUR LIFE?: NO

## 2024-02-22 ASSESSMENT — ACTIVITIES OF DAILY LIVING (ADL): ADLS_ACUITY_SCORE: 35

## 2024-02-22 NOTE — ED PROVIDER NOTES
EMERGENCY DEPARTMENT ENCOUNTER      NAME: Dorothy Mitchell  AGE: 34 year old female  YOB: 1990  MRN: [id number]  EVALUATION DATE & TIME: No admission date for patient encounter.    PCP: [facility]    ED PROVIDER: Hortencia Olivarez M.D.      Chief Complaint   Patient presents with    Altered Mental Status         FINAL IMPRESSION:  1. Adverse effect of synthetic cannabinoids, initial encounter          ED COURSE & MEDICAL DECISION MAKING:    ED Course as of 02/22/24 1808   Thu Feb 22, 2024   1653 Pt with h/o methadone use for opioid addiction after opioid therapy for RSD BIB  with tearfulness and feeling anxious, neuro intact,  with crying/anxiety in triage, denies h/o panic attacks. She is able to reliably tell me what is happening currently and at home, anxious appearing overall, with very likely anxiety. UDS and chemistry pending with EtOH, hcg and CBC and will serially reassess   1737 UDS with cannabinoids present. Viral PCR still pending.   1804 Patient reassessed and markedly improved, and cannabinoid intoxication now resolved likely etiology of brief affect anomalies. Patient discharged after being provided with extensive anticipatory guidance and given return precautions, importance of PMD follow-up emphasized.      4:47 PM I met the patient and performed my initial interview and exam. She came in with her  with crying.    Pertinent Labs & Imaging studies reviewed. (See chart for details)    N95 worn  A face shield was worn also  COVID PPE    Medical Decision Making  Obtained supplemental history:Supplemental history obtained?: Family Member/Significant Other  Reviewed external records: External records reviewed?: No  Care impacted by chronic illness:Mental Health  Care significantly affected by social determinants of health:Alcohol Abuse and/or Recreational Drug Use  Did you consider but not order tests?: Work up considered but not performed and documented in  chart, if applicable  Did you interpret images independently?: Independent interpretation of ECG and images noted in documentation, when applicable.  Consultation discussion with other provider:Did you involve another provider (consultant, , pharmacy, etc.)?: No  Discharge. No recommendations on prescription strength medication(s). See documentation for any additional details.    At the conclusion of the encounter I discussed the results of all of the tests and the disposition. The questions were answered. The patient or family acknowledged understanding and was agreeable with the care plan.     MEDICATIONS GIVEN IN THE EMERGENCY:  Medications - No data to display    NEW PRESCRIPTIONS STARTED AT TODAY'S ER VISIT  New Prescriptions    No medications on file          =================================================================    HPI      Dorothy Mitchell is a 34 year old female with PMHx of opioid dependence with Methadone, RSD, anxiety disorder, who presents to the ED today via walk-in with her spouse for evaluation of altered mental status.    2 days ago, the patient broke her right arm at the wrist. She then had a splint placed by [facility]. Today, she did not go to work so she could instead heal. This afternoon, she became tearful and called her  to come home early. She remained tearful and anxious.     She says she has previous diagnosis of a neck aneurysm. She notes no cough, but feels like she was recently congested. She has no possibility of pregnancy. She is taking no OTC medications at current, and has had no recent medication changes or new medications. She has no headache, loss of strength, loss of sensation, fever.    She denies current drug and alcohol use.      REVIEW OF SYSTEMS   All other systems reviewed and are negative except as noted above in HPI.    PAST MEDICAL HISTORY:  Past Medical History:   Diagnosis Date    Acute opioid withdrawal (H)     Anemia     Anxiety disorder      Blood clotting disorder (H24)     pregnancy    Colloid cyst of third ventricle (H)     Depression     Depression affecting pregnancy in second trimester, antepartum     High risk pregnancy due to maternal drug abuse (H)     Low back pain of over 3 months duration     Miscarriage     X5    Ovarian cyst     RSD lower limb     Right foot    RSD lower limb, right 10/26/2015       PAST SURGICAL HISTORY:  Past Surgical History:   Procedure Laterality Date    APPENDECTOMY  2011    APPENDECTOMY OPEN      DILATION AND CURETTAGE      HC DILATION/CURETTAGE DIAG/THER NON OB      Description: Dilation And Curettage;  Recorded: 05/02/2012;    HC REMOVAL OF TONSILS,<13 Y/O      Description: Tonsillectomy;  Recorded: 05/02/2012;    HC REMOVAL OF TONSILS,<13 Y/O      Description: Tonsillectomy;  Recorded: 11/20/2012;    LAPAROSCOPY DIAGNOSTIC (GENERAL)  10/30/15    TONSILLECTOMY      TONSILLECTOMY      [facility] APPENDECTOMY      Description: Appendectomy;  Recorded: 05/02/2012;    [facility] APPENDECTOMY      Description: Appendectomy;  Recorded: 11/20/2012;       CURRENT MEDICATIONS:    buPROPion (WELLBUTRIN XL) 300 MG 24 hr tablet  FLUoxetine (PROZAC) 40 MG capsule  levothyroxine (SYNTHROID/LEVOTHROID) 125 MCG tablet  methocarbamol (ROBAXIN) 500 MG tablet  methylPREDNISolone (MEDROL DOSEPAK) 4 MG tablet therapy pack  spironolactone (ALDACTONE) 50 MG tablet        ALLERGIES:  Allergies   Allergen Reactions    No Known Drug Allergy Unknown    Other Environmental Allergy Swelling     Metal       FAMILY HISTORY:  Family History   Problem Relation Age of Onset    Diabetes Mother     Heart Disease Father     Depression Maternal Grandmother     Hypertension Maternal Grandmother     Anxiety Disorder Maternal Grandmother     Hypertension Maternal Grandfather     Diabetes Brother        SOCIAL HISTORY:   Social History     Socioeconomic History    Marital status: Single     Spouse name: None    Number of children: 1    Years of education: None     Highest education level: None   Tobacco Use    Smoking status: Never    Smokeless tobacco: Never   Substance and Sexual Activity    Alcohol use: No     Comment: weekends    Drug use: Yes     Types: Oxycodone    Sexual activity: Yes     Partners: Male     Birth control/protection: None   Social History Narrative    Lives with family.        VITALS:  Patient Vitals for the past 24 hrs:   BP Temp Temp src Pulse Resp SpO2 Weight   02/22/24 1745 -- -- -- 93 -- 98 % --   02/22/24 1639 112/69 98.7  F (37.1  C) Oral 115 20 95 % 70.3 kg (155 lb)       PHYSICAL EXAM    GENERAL: Awake, alert.  In no acute distress.   HEENT: Normocephalic, atraumatic.  Pupils equal, round and reactive.  Conjunctiva normal.  EOMI.  NECK: No stridor or apparent deformity.  PULMONARY: Symmetrical breath sounds without distress.  Lungs clear to auscultation bilaterally without wheezes, rhonchi or rales.  CARDIO: Regular rate and rhythm.  No significant murmur, rub or gallop.  Radial pulses strong and symmetrical.  ABDOMINAL: Abdomen soft, non-distended and non-tender to palpation.  No CVAT, no palpable hepatosplenomegaly.  EXTREMITIES: No lower extremity swelling or edema.    NEURO: Alert and oriented to person, place and time.  Cranial nerves grossly intact.  No focal motor deficit.  PSYCH: Normal mood and affect. Tearful.   SKIN: No rashes      LAB:  All pertinent labs reviewed and interpreted.  Results for orders placed or performed during the hospital encounter of 02/22/24   Basic metabolic panel   Result Value Ref Range    Sodium 137 135 - 145 mmol/L    Potassium 3.9 3.4 - 5.3 mmol/L    Chloride 99 98 - 107 mmol/L    Carbon Dioxide (CO2) 26 22 - 29 mmol/L    Anion Gap 12 7 - 15 mmol/L    Urea Nitrogen 8.2 6.0 - 20.0 mg/dL    Creatinine 0.64 0.51 - 0.95 mg/dL    GFR Estimate >90 >60 mL/min/1.73m2    Calcium 9.2 8.6 - 10.0 mg/dL    Glucose 68 (L) 70 - 99 mg/dL   Ethyl Alcohol Level   Result Value Ref Range    Alcohol ethyl <0.01 <=0.01 g/dL    HCG qualitative Blood   Result Value Ref Range    hCG Serum Qualitative Negative Negative   UA with Microscopic reflex to Culture    Specimen: Urine, Clean Catch   Result Value Ref Range    Color Urine Yellow Colorless, Straw, Light Yellow, Yellow    Appearance Urine Turbid (A) Clear    Glucose Urine Negative Negative mg/dL    Bilirubin Urine Negative Negative    Ketones Urine Negative Negative mg/dL    Specific Gravity Urine 1.022 1.001 - 1.030    Blood Urine >1.0 mg/dL (A) Negative    pH Urine 6.5 5.0 - 7.0    Protein Albumin Urine 10 (A) Negative mg/dL    Urobilinogen Urine <2.0 <2.0 mg/dL    Nitrite Urine Negative Negative    Leukocyte Esterase Urine 250 [place]/uL (A) Negative    Mucus Urine Present (A) None Seen /LPF    RBC Urine 100 (H) <=2 /HPF    WBC Urine 7 (H) <=5 /HPF    Squamous Epithelials Urine 13 (H) <=1 /HPF   CBC with platelets and differential   Result Value Ref Range    WBC Count 12.6 (H) 4.0 - 11.0 10e3/uL    RBC Count 4.71 3.80 - 5.20 10e6/uL    Hemoglobin 12.5 11.7 - 15.7 g/dL    Hematocrit 39.2 35.0 - 47.0 %    MCV 83 78 - 100 fL    MCH 26.5 26.5 - 33.0 pg    MCHC 31.9 31.5 - 36.5 g/dL    RDW 12.5 10.0 - 15.0 %    Platelet Count 329 150 - 450 10e3/uL    % Neutrophils 77 %    % Lymphocytes 15 %    % Monocytes 6 %    % Eosinophils 1 %    % Basophils 0 %    % Immature Granulocytes 1 %    NRBCs per 100 WBC 0 <1 /100    Absolute Neutrophils 9.8 (H) 1.6 - 8.3 10e3/uL    Absolute Lymphocytes 1.9 0.8 - 5.3 10e3/uL    Absolute Monocytes 0.7 0.0 - 1.3 10e3/uL    Absolute Eosinophils 0.2 0.0 - 0.7 10e3/uL    Absolute Basophils 0.0 0.0 - 0.2 10e3/uL    Absolute Immature Granulocytes 0.1 <=0.4 10e3/uL    Absolute NRBCs 0.0 10e3/uL   Urine Drug Screen Panel   Result Value Ref Range    Amphetamines Urine Screen Negative Screen Negative    Barbituates Urine Screen Negative Screen Negative    Benzodiazepine Urine Screen Negative Screen Negative    Cannabinoids Urine Screen Positive (A) Screen Negative     Cocaine Urine Screen Negative Screen Negative    Fentanyl Qual Urine Screen Negative Screen Negative    Opiates Urine Screen Negative Screen Negative    PCP Urine Screen Negative Screen Negative   Symptomatic Influenza A/B, RSV, & SARS-CoV2 PCR (COVID-19) Nasopharyngeal    Specimen: Nasopharyngeal; Swab   Result Value Ref Range    Influenza A PCR Negative Negative    Influenza B PCR Negative Negative    RSV PCR Negative Negative    SARS CoV2 PCR Negative Negative           I, Dhruv Jagjit, am serving as a scribe to document services personally performed by Dr. Hortencia Olivarez based on my observation and the provider's statements to me. I, Hortencia Olivarez MD attest that Dhruv Danielson is acting in a scribe capacity, has observed my performance of the services and has documented them in accordance with my direction.       Hortencia Olivarez MD  02/22/24 5125

## 2024-02-22 NOTE — ED TRIAGE NOTES
Pt has a recent right wrist break.  She was getting help from her sig other to put her socks on and she became confused.  She is unable to reliably tell what is going on her right now.  She has a small aneurysm that she denies.  Pt is feeling stressed.  She denies pain.  She does take Methadone but appropriately.     Triage Assessment (Adult)       Row Name 02/22/24 5506          Triage Assessment    Airway WDL WDL        Respiratory WDL    Respiratory WDL WDL        Skin Circulation/Temperature WDL    Skin Circulation/Temperature WDL WDL        Cardiac WDL    Cardiac WDL WDL        Peripheral/Neurovascular WDL    Peripheral Neurovascular WDL WDL        Cognitive/Neuro/Behavioral WDL    Cognitive/Neuro/Behavioral WDL WDL

## 2024-02-23 LAB — BACTERIA UR CULT: ABNORMAL

## 2024-09-14 ENCOUNTER — HEALTH MAINTENANCE LETTER (OUTPATIENT)
Age: 34
End: 2024-09-14

## 2024-10-10 ENCOUNTER — LAB REQUISITION (OUTPATIENT)
Dept: LAB | Facility: CLINIC | Age: 34
End: 2024-10-10
Payer: COMMERCIAL

## 2024-10-10 DIAGNOSIS — Z13.220 ENCOUNTER FOR SCREENING FOR LIPOID DISORDERS: ICD-10-CM

## 2024-10-10 DIAGNOSIS — Z13.1 ENCOUNTER FOR SCREENING FOR DIABETES MELLITUS: ICD-10-CM

## 2024-10-10 DIAGNOSIS — Z13.0 ENCOUNTER FOR SCREENING FOR DISEASES OF THE BLOOD AND BLOOD-FORMING ORGANS AND CERTAIN DISORDERS INVOLVING THE IMMUNE MECHANISM: ICD-10-CM

## 2024-10-10 DIAGNOSIS — Z12.4 ENCOUNTER FOR SCREENING FOR MALIGNANT NEOPLASM OF CERVIX: ICD-10-CM

## 2024-10-10 PROCEDURE — 83036 HEMOGLOBIN GLYCOSYLATED A1C: CPT | Mod: ORL | Performed by: OBSTETRICS & GYNECOLOGY

## 2024-10-10 PROCEDURE — 85027 COMPLETE CBC AUTOMATED: CPT | Mod: ORL | Performed by: OBSTETRICS & GYNECOLOGY

## 2024-10-10 PROCEDURE — G0145 SCR C/V CYTO,THINLAYER,RESCR: HCPCS | Mod: ORL | Performed by: OBSTETRICS & GYNECOLOGY

## 2024-10-10 PROCEDURE — 80053 COMPREHEN METABOLIC PANEL: CPT | Mod: ORL | Performed by: OBSTETRICS & GYNECOLOGY

## 2024-10-10 PROCEDURE — 88141 CYTOPATH C/V INTERPRET: CPT | Performed by: PATHOLOGY

## 2024-10-10 PROCEDURE — 80061 LIPID PANEL: CPT | Mod: ORL | Performed by: OBSTETRICS & GYNECOLOGY

## 2024-10-10 PROCEDURE — 87624 HPV HI-RISK TYP POOLED RSLT: CPT | Mod: ORL | Performed by: OBSTETRICS & GYNECOLOGY

## 2024-10-11 LAB
ALBUMIN SERPL BCG-MCNC: 4.3 G/DL (ref 3.5–5.2)
ALP SERPL-CCNC: 80 U/L (ref 40–150)
ALT SERPL W P-5'-P-CCNC: 13 U/L (ref 0–50)
ANION GAP SERPL CALCULATED.3IONS-SCNC: 8 MMOL/L (ref 7–15)
AST SERPL W P-5'-P-CCNC: 23 U/L (ref 0–45)
BILIRUB SERPL-MCNC: 0.4 MG/DL
BUN SERPL-MCNC: 7.5 MG/DL (ref 6–20)
CALCIUM SERPL-MCNC: 9.7 MG/DL (ref 8.8–10.4)
CHLORIDE SERPL-SCNC: 96 MMOL/L (ref 98–107)
CHOLEST SERPL-MCNC: 206 MG/DL
CREAT SERPL-MCNC: 0.71 MG/DL (ref 0.51–0.95)
EGFRCR SERPLBLD CKD-EPI 2021: >90 ML/MIN/1.73M2
ERYTHROCYTE [DISTWIDTH] IN BLOOD BY AUTOMATED COUNT: 12.5 % (ref 10–15)
EST. AVERAGE GLUCOSE BLD GHB EST-MCNC: 117 MG/DL
FASTING STATUS PATIENT QL REPORTED: NO
FASTING STATUS PATIENT QL REPORTED: NO
GLUCOSE SERPL-MCNC: 75 MG/DL (ref 70–99)
HBA1C MFR BLD: 5.7 %
HCO3 SERPL-SCNC: 31 MMOL/L (ref 22–29)
HCT VFR BLD AUTO: 38.9 % (ref 35–47)
HDLC SERPL-MCNC: 58 MG/DL
HGB BLD-MCNC: 12.6 G/DL (ref 11.7–15.7)
LDLC SERPL CALC-MCNC: 121 MG/DL
MCH RBC QN AUTO: 27 PG (ref 26.5–33)
MCHC RBC AUTO-ENTMCNC: 32.4 G/DL (ref 31.5–36.5)
MCV RBC AUTO: 84 FL (ref 78–100)
NONHDLC SERPL-MCNC: 148 MG/DL
PLATELET # BLD AUTO: 367 10E3/UL (ref 150–450)
POTASSIUM SERPL-SCNC: 4.3 MMOL/L (ref 3.4–5.3)
PROT SERPL-MCNC: 7.8 G/DL (ref 6.4–8.3)
RBC # BLD AUTO: 4.66 10E6/UL (ref 3.8–5.2)
SODIUM SERPL-SCNC: 135 MMOL/L (ref 135–145)
TRIGL SERPL-MCNC: 135 MG/DL
WBC # BLD AUTO: 6.8 10E3/UL (ref 4–11)

## 2024-10-14 LAB
HPV HR 12 DNA CVX QL NAA+PROBE: POSITIVE
HPV16 DNA CVX QL NAA+PROBE: NEGATIVE
HPV18 DNA CVX QL NAA+PROBE: NEGATIVE
HUMAN PAPILLOMA VIRUS FINAL DIAGNOSIS: ABNORMAL

## 2024-10-17 LAB
BKR AP ASSOCIATED HPV REPORT: ABNORMAL
BKR LAB AP GYN ADEQUACY: ABNORMAL
BKR LAB AP GYN INTERPRETATION: ABNORMAL
BKR LAB AP LMP: ABNORMAL
BKR LAB AP PREVIOUS ABNL DX: ABNORMAL
BKR LAB AP PREVIOUS ABNORMAL: ABNORMAL
PATH REPORT.COMMENTS IMP SPEC: ABNORMAL
PATH REPORT.COMMENTS IMP SPEC: ABNORMAL
PATH REPORT.RELEVANT HX SPEC: ABNORMAL